# Patient Record
Sex: MALE | Race: BLACK OR AFRICAN AMERICAN | NOT HISPANIC OR LATINO
[De-identification: names, ages, dates, MRNs, and addresses within clinical notes are randomized per-mention and may not be internally consistent; named-entity substitution may affect disease eponyms.]

---

## 2017-09-11 ENCOUNTER — APPOINTMENT (OUTPATIENT)
Dept: CARDIOLOGY | Facility: CLINIC | Age: 52
End: 2017-09-11

## 2021-03-02 ENCOUNTER — RESULT REVIEW (OUTPATIENT)
Age: 56
End: 2021-03-02

## 2021-03-23 ENCOUNTER — NON-APPOINTMENT (OUTPATIENT)
Age: 56
End: 2021-03-23

## 2021-03-23 ENCOUNTER — APPOINTMENT (OUTPATIENT)
Dept: RADIATION ONCOLOGY | Facility: CLINIC | Age: 56
End: 2021-03-23
Payer: MEDICAID

## 2021-03-23 ENCOUNTER — OUTPATIENT (OUTPATIENT)
Dept: OUTPATIENT SERVICES | Facility: HOSPITAL | Age: 56
LOS: 1 days | Discharge: HOME | End: 2021-03-23
Payer: MEDICAID

## 2021-03-23 VITALS
TEMPERATURE: 97 F | WEIGHT: 159.94 LBS | DIASTOLIC BLOOD PRESSURE: 78 MMHG | RESPIRATION RATE: 17 BRPM | HEIGHT: 73 IN | BODY MASS INDEX: 21.2 KG/M2 | HEART RATE: 70 BPM | SYSTOLIC BLOOD PRESSURE: 123 MMHG | OXYGEN SATURATION: 98 %

## 2021-03-23 DIAGNOSIS — Z87.891 PERSONAL HISTORY OF NICOTINE DEPENDENCE: ICD-10-CM

## 2021-03-23 DIAGNOSIS — Z98.89 OTHER SPECIFIED POSTPROCEDURAL STATES: Chronic | ICD-10-CM

## 2021-03-23 DIAGNOSIS — Z78.9 OTHER SPECIFIED HEALTH STATUS: ICD-10-CM

## 2021-03-23 PROCEDURE — 99072 ADDL SUPL MATRL&STAF TM PHE: CPT

## 2021-03-23 PROCEDURE — 99204 OFFICE O/P NEW MOD 45 MIN: CPT | Mod: 25,GC

## 2021-03-23 RX ORDER — HYDROCODONE BITARTRATE AND ACETAMINOPHEN 5; 325 MG/1; MG/1
5-325 TABLET ORAL
Refills: 0 | Status: ACTIVE | COMMUNITY

## 2021-03-23 NOTE — VITALS
[Maximal Pain Intensity: 7/10] : 7/10 [Least Pain Intensity: 0/10] : 0/10 [Pain Description/Quality: ___] : Pain description/quality: [unfilled] [Pain Duration: ___] : Pain duration: [unfilled] [Pain Location: ___] : Pain Location: [unfilled] [Pain Interferes with ADLs] : Pain interferes with activities of daily living. [NoTreatment Scheduled] : no treatment scheduled [80: Normal activity with effort; some signs or symptoms of disease.] : 80: Normal activity with effort; some signs or symptoms of disease.  [ECOG Performance Status: 1 - Restricted in physically strenuous activity but ambulatory and able to carry out work of a light or sedentary nature] : Performance Status: 1 - Restricted in physically strenuous activity but ambulatory and able to carry out work of a light or sedentary nature, e.g., light house work, office work

## 2021-03-23 NOTE — REASON FOR VISIT
[Head and Neck Cancer] : head and neck cancer [Spouse] : spouse [Consideration of Curative Therapy] : consideration of curative therapy for

## 2021-03-24 ENCOUNTER — NON-APPOINTMENT (OUTPATIENT)
Age: 56
End: 2021-03-24

## 2021-03-24 NOTE — DISEASE MANAGEMENT
[Clinical] : TNM Stage: c [III] : III [FreeTextEntry4] : tonsil sqcc [TTNM] : 2 [NTNM] : 1 [MTNM] : 0

## 2021-03-24 NOTE — HISTORY OF PRESENT ILLNESS
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 \par Lymph node, right level 2 biopsy positive for squamous cell carcinoma on March 2 2021; reportedly HPV/P16 negative. \par No scans\par \par Outside Ct neck and Pet showed right sided tonsil mass extending to Right BOT. Right sided LAD max 2.8cm \par \par Tonsillectomy 3/9, path not available. \par \par He has lost 20 lbs weight since surgery. \par \par \par \par

## 2021-03-24 NOTE — PHYSICAL EXAM
[Normal] : well developed, well nourished, in no acute distress [Sclera] : the sclera and conjunctiva were normal [Outer Ear] : the ears and nose were normal in appearance [] : no respiratory distress [Heart Rate And Rhythm] : heart rate and rhythm were normal [de-identified] : s/p b/l tonsillectomy, 4cm right sided fixed level 2 LN

## 2021-03-25 ENCOUNTER — OUTPATIENT (OUTPATIENT)
Dept: OUTPATIENT SERVICES | Facility: HOSPITAL | Age: 56
LOS: 1 days | Discharge: HOME | End: 2021-03-25

## 2021-03-25 DIAGNOSIS — C09.9 MALIGNANT NEOPLASM OF TONSIL, UNSPECIFIED: ICD-10-CM

## 2021-03-25 DIAGNOSIS — Z98.89 OTHER SPECIFIED POSTPROCEDURAL STATES: Chronic | ICD-10-CM

## 2021-03-30 ENCOUNTER — OUTPATIENT (OUTPATIENT)
Dept: OUTPATIENT SERVICES | Facility: HOSPITAL | Age: 56
LOS: 1 days | Discharge: HOME | End: 2021-03-30

## 2021-03-30 ENCOUNTER — RESULT REVIEW (OUTPATIENT)
Age: 56
End: 2021-03-30

## 2021-03-30 ENCOUNTER — APPOINTMENT (OUTPATIENT)
Dept: HEMATOLOGY ONCOLOGY | Facility: CLINIC | Age: 56
End: 2021-03-30
Payer: MEDICAID

## 2021-03-30 ENCOUNTER — APPOINTMENT (OUTPATIENT)
Dept: OTOLARYNGOLOGY | Facility: CLINIC | Age: 56
End: 2021-03-30
Payer: MEDICAID

## 2021-03-30 VITALS
DIASTOLIC BLOOD PRESSURE: 82 MMHG | HEIGHT: 73 IN | SYSTOLIC BLOOD PRESSURE: 126 MMHG | BODY MASS INDEX: 20.54 KG/M2 | WEIGHT: 155 LBS | HEART RATE: 75 BPM

## 2021-03-30 VITALS
TEMPERATURE: 98.1 F | RESPIRATION RATE: 18 BRPM | DIASTOLIC BLOOD PRESSURE: 84 MMHG | HEART RATE: 67 BPM | OXYGEN SATURATION: 99 % | WEIGHT: 161.6 LBS | BODY MASS INDEX: 21.42 KG/M2 | SYSTOLIC BLOOD PRESSURE: 131 MMHG | HEIGHT: 72.99 IN

## 2021-03-30 DIAGNOSIS — Z98.89 OTHER SPECIFIED POSTPROCEDURAL STATES: Chronic | ICD-10-CM

## 2021-03-30 DIAGNOSIS — C79.89 SECONDARY MALIGNANT NEOPLASM OF OTHER SPECIFIED SITES: ICD-10-CM

## 2021-03-30 DIAGNOSIS — C80.1 SECONDARY MALIGNANT NEOPLASM OF OTHER SPECIFIED SITES: ICD-10-CM

## 2021-03-30 LAB
BASOPHILS # BLD AUTO: 0.1 K/UL — SIGNIFICANT CHANGE UP (ref 0–0.2)
BASOPHILS NFR BLD AUTO: 1.2 % — SIGNIFICANT CHANGE UP (ref 0–2)
EOSINOPHIL # BLD AUTO: 0.41 K/UL — SIGNIFICANT CHANGE UP (ref 0–0.5)
EOSINOPHIL NFR BLD AUTO: 4.9 % — SIGNIFICANT CHANGE UP (ref 0–6)
HCT VFR BLD CALC: 40.8 % — SIGNIFICANT CHANGE UP (ref 39–50)
HGB BLD-MCNC: 13.4 G/DL — SIGNIFICANT CHANGE UP (ref 13–17)
IMM GRANULOCYTES NFR BLD AUTO: 0.5 % — SIGNIFICANT CHANGE UP (ref 0–1.5)
LYMPHOCYTES # BLD AUTO: 2.67 K/UL — SIGNIFICANT CHANGE UP (ref 1–3.3)
LYMPHOCYTES # BLD AUTO: 31.9 % — SIGNIFICANT CHANGE UP (ref 13–44)
MCHC RBC-ENTMCNC: 29.2 PG — SIGNIFICANT CHANGE UP (ref 27–34)
MCHC RBC-ENTMCNC: 32.8 G/DL — SIGNIFICANT CHANGE UP (ref 32–36)
MCV RBC AUTO: 88.9 FL — SIGNIFICANT CHANGE UP (ref 80–100)
MONOCYTES # BLD AUTO: 0.91 K/UL — HIGH (ref 0–0.9)
MONOCYTES NFR BLD AUTO: 10.9 % — SIGNIFICANT CHANGE UP (ref 2–14)
NEUTROPHILS # BLD AUTO: 4.24 K/UL — SIGNIFICANT CHANGE UP (ref 1.8–7.4)
NEUTROPHILS NFR BLD AUTO: 50.6 % — SIGNIFICANT CHANGE UP (ref 43–77)
NRBC # BLD: 0 /100 WBCS — SIGNIFICANT CHANGE UP (ref 0–0)
PLATELET # BLD AUTO: 396 K/UL — SIGNIFICANT CHANGE UP (ref 150–400)
RBC # BLD: 4.59 M/UL — SIGNIFICANT CHANGE UP (ref 4.2–5.8)
RBC # FLD: 14.2 % — SIGNIFICANT CHANGE UP (ref 10.3–14.5)
WBC # BLD: 8.37 K/UL — SIGNIFICANT CHANGE UP (ref 3.8–10.5)
WBC # FLD AUTO: 8.37 K/UL — SIGNIFICANT CHANGE UP (ref 3.8–10.5)

## 2021-03-30 PROCEDURE — 99205 OFFICE O/P NEW HI 60 MIN: CPT

## 2021-03-30 PROCEDURE — 99072 ADDL SUPL MATRL&STAF TM PHE: CPT

## 2021-03-30 PROCEDURE — 99205 OFFICE O/P NEW HI 60 MIN: CPT | Mod: 25

## 2021-03-30 PROCEDURE — 31575 DIAGNOSTIC LARYNGOSCOPY: CPT

## 2021-03-30 RX ORDER — NUT.TX.IMPAIRED DIGESTIVE FXN 0.035-1/ML
LIQUID (ML) ORAL
Qty: 60 | Refills: 0 | Status: ACTIVE | COMMUNITY
Start: 2021-03-30 | End: 1900-01-01

## 2021-03-30 NOTE — DATA REVIEWED
[de-identified] : Path:\par R neck FNA = SCCA\par \par Tonsils and BOT biopsies - Negative for CA\par  [de-identified] : PET/CT - + in R neck with low uptake in tonsils

## 2021-03-30 NOTE — PHYSICAL EXAM
[Midline] : trachea located in midline position [Laryngoscopy Performed] : laryngoscopy was performed, see procedure section for findings [Normal] : no rashes [de-identified] : 5 cm mobile nontender R level II neck mass.

## 2021-03-30 NOTE — PROCEDURE
[Unable to Cooperate with Mirror] : patient unable to cooperate with mirror [Lesion] : lesion identified by mirror examination needing further evaluation [Topical Lidocaine] : topical lidocaine [Oxymetazoline HCl] : oxymetazoline HCl [Flexible Endoscope] : examined with the flexible endoscope [Serial Number: ___] : Serial Number: [unfilled] [Normal] : the false vocal folds were pink and regular, the ventricular sulcus was open, the true vocal folds were glistening white, tense and of equal length, mobility, and height [True Vocal Cords Paralysis] : no true vocal cord paralysis [True Vocal Cords Erythematous] : no true vocal cord edema [True Vocal Cords Watson's Nodules] : no true vocal cord nodules [Glottis Arytenoid Cartilages] : no arytenoid granulomas [Glottis Arytenoid Cartilages Erythema] : no arytenoid erythema [Interarytenoid Edema] : interarytenoid area edematous

## 2021-03-30 NOTE — HISTORY OF PRESENT ILLNESS
[de-identified] : 55 year male with history of tonsil cancer, referred by Dr. Negro Moser, for right sided neck mass. Reports progressively have gotten larger. Denies pain, drainage. FNA 03/02/2021 showed squamous cell carcinoma. S/p tonsillectomy 03/09/2021, reports weight loss of 22 lb since then. States loss of taste after surgery. Denies dysphagia, odynophagia, dyspnea, dysphonia. Reports right otalgia. Reports feels like he's underwater. Denies hearing loss. Denies otorrhea. Has appointment with Dr. Moser for radiation suit fitting.

## 2021-03-30 NOTE — ASSESSMENT
[FreeTextEntry1] : T0N2M0 unknown primary SCCA of R neck.  Pt has already had a bilateral tonsillectomy and R BOT biopsies.  No primary tumor seen.  Pt to proceed with Chemo/RT.  Pt to f/u with me a month after treatment.  If the neck node does not resolve he may need a R MRND.

## 2021-03-30 NOTE — CONSULT LETTER
[Dear  ___] : Dear  [unfilled], [Consult Letter:] : I had the pleasure of evaluating your patient, [unfilled]. [Please see my note below.] : Please see my note below. [Consult Closing:] : Thank you very much for allowing me to participate in the care of this patient.  If you have any questions, please do not hesitate to contact me. [Sincerely,] : Sincerely, [DrHammad ___] : Dr. ALMAZAN [FreeTextEntry2] : Dr. Negro Moser ([facility] physician)\par  [FreeTextEntry3] : Daryl Mcneill MD, FACS\par Chief of Otolaryngology at [facility]\par \par Dept. of Otolaryngology\par [facility]\par   [DrHammad  ___] : Dr. ALMAZAN

## 2021-03-30 NOTE — REASON FOR VISIT
[Initial Consultation] : an initial consultation for [FreeTextEntry2] : referred by Dr. Negro Moser, for right sided neck mass.

## 2021-03-31 ENCOUNTER — NON-APPOINTMENT (OUTPATIENT)
Age: 56
End: 2021-03-31

## 2021-03-31 LAB — SARS-COV-2 RNA SPEC QL NAA+PROBE: SIGNIFICANT CHANGE UP

## 2021-03-31 PROCEDURE — 99072 ADDL SUPL MATRL&STAF TM PHE: CPT

## 2021-03-31 PROCEDURE — 77334 RADIATION TREATMENT AID(S): CPT | Mod: 26

## 2021-03-31 PROCEDURE — 77263 THER RADIOLOGY TX PLNG CPLX: CPT

## 2021-04-02 ENCOUNTER — APPOINTMENT (OUTPATIENT)
Dept: THORACIC SURGERY | Facility: CLINIC | Age: 56
End: 2021-04-02
Payer: MEDICAID

## 2021-04-02 VITALS
BODY MASS INDEX: 19.88 KG/M2 | OXYGEN SATURATION: 99 % | SYSTOLIC BLOOD PRESSURE: 149 MMHG | HEIGHT: 73 IN | RESPIRATION RATE: 16 BRPM | HEART RATE: 72 BPM | DIASTOLIC BLOOD PRESSURE: 89 MMHG | WEIGHT: 150 LBS

## 2021-04-02 LAB
ALBUMIN SERPL ELPH-MCNC: 3.9 G/DL
ALP BLD-CCNC: 60 U/L
ALT SERPL-CCNC: 17 U/L
ANION GAP SERPL CALC-SCNC: 13 MMOL/L
AST SERPL-CCNC: 20 U/L
BILIRUB SERPL-MCNC: 0.4 MG/DL
BUN SERPL-MCNC: 15 MG/DL
CALCIUM SERPL-MCNC: 9.5 MG/DL
CHLORIDE SERPL-SCNC: 103 MMOL/L
CO2 SERPL-SCNC: 25 MMOL/L
CREAT SERPL-MCNC: 1.24 MG/DL
GLUCOSE SERPL-MCNC: 78 MG/DL
HBV CORE IGG+IGM SER QL: REACTIVE
HBV SURFACE AB SER QL: REACTIVE
HBV SURFACE AG SER QL: NONREACTIVE
HCV AB SER QL: NONREACTIVE
HCV S/CO RATIO: 0.05 S/CO
HIV1+2 AB SPEC QL IA.RAPID: NONREACTIVE
POTASSIUM SERPL-SCNC: 4.4 MMOL/L
PROT SERPL-MCNC: 6.4 G/DL
SODIUM SERPL-SCNC: 141 MMOL/L
TSH SERPL-ACNC: 0.47 UIU/ML

## 2021-04-02 PROCEDURE — 99205 OFFICE O/P NEW HI 60 MIN: CPT

## 2021-04-02 PROCEDURE — 99072 ADDL SUPL MATRL&STAF TM PHE: CPT

## 2021-04-02 NOTE — HISTORY OF PRESENT ILLNESS
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 \par S/p tonsillectomy 03/09/2021, path negative for malignancy.\par Reports weight loss of 22 lb since tonsillectomy. States loss of taste after surgery. \par \par PET/CT 3/9/2021 - Hypermetabolic right neck lesion likely secondary to enlarged hypermetabolic LNs. There is slight asymmetry of the tonsils with slight increased activity on the right versus the left. No hypermetabolic evidence of distant metastatic disease. \par \par Denies dysphagia, odynophagia, dyspnea, dysphonia. Reports right otalgia. Reports feels like he's underwater. Denies hearing loss.\par

## 2021-04-02 NOTE — PHYSICAL EXAM
[General Appearance - Alert] : alert [General Appearance - In No Acute Distress] : in no acute distress [General Appearance - Well Nourished] : well nourished [General Appearance - Well-Appearing] : healthy appearing [General Appearance - Well Developed] : well developed [Sclera] : the sclera and conjunctiva were normal [PERRL With Normal Accommodation] : pupils were equal in size, round, and reactive to light [Extraocular Movements] : extraocular movements were intact [Outer Ear] : the ears and nose were normal in appearance [Hearing Threshold Finger Rub Not [place]] : hearing was normal [Examination Of The Oral Cavity] : the lips and gums were normal [Neck Appearance] : the appearance of the neck was normal [Neck Cervical Mass (___cm)] : no neck mass was observed [Respiration, Rhythm And Depth] : normal respiratory rhythm and effort [Exaggerated Use Of Accessory Muscles For Inspiration] : no accessory muscle use [Auscultation Breath Sounds / Voice Sounds] : lungs were clear to auscultation bilaterally [Heart Rate And Rhythm] : heart rate was normal and rhythm regular [Examination Of The Chest] : the chest was normal in appearance [Chest Visual Inspection Thoracic Asymmetry] : no chest asymmetry [Diminished Respiratory Excursion] : normal chest expansion [2+] : left 2+ [No Pulse Delay] : no pulse delay [Breast Palpation Mass] : no palpable masses [Breast Appearance] : normal in appearance [Bowel Sounds] : normal bowel sounds [Abdomen Soft] : soft [Abdomen Tenderness] : non-tender [Cervical Lymph Nodes Enlarged Posterior Bilaterally] : posterior cervical [Supraclavicular Lymph Nodes Enlarged Bilaterally] : supraclavicular [Cervical Lymph Nodes Enlarged Anterior Bilaterally] : anterior cervical [No CVA Tenderness] : no ~M costovertebral angle tenderness [No Spinal Tenderness] : no spinal tenderness [Abnormal Walk] : normal gait [Nail Clubbing] : no clubbing  or cyanosis of the fingernails [Musculoskeletal - Swelling] : no joint swelling seen [Skin Color & Pigmentation] : normal skin color and pigmentation [Skin Turgor] : normal skin turgor [] : no rash [Deep Tendon Reflexes (DTR)] : deep tendon reflexes were 2+ and symmetric [Sensation] : the sensory exam was normal to light touch and pinprick [Motor Exam] : the motor exam was normal [No Focal Deficits] : no focal deficits [Oriented To Time, Place, And Person] : oriented to person, place, and time [Impaired Insight] : insight and judgment were intact [Affect] : the affect was normal [Mood] : the mood was normal [Memory Recent] : recent memory was not impaired [Memory Remote] : remote memory was not impaired [FreeTextEntry1] : Deferred

## 2021-04-02 NOTE — HISTORY OF PRESENT ILLNESS
[FreeTextEntry1] : Mr. 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 \par \par Presents to office for evaluation of feeding tube placement. Endorses that he's able to swallow solids without difficulty, but has experienced an approximate 20lb weight loss since surgery due to loss of taste. Today, Patient denies worsening SOB, chest pain, cough, hemoptysis, fever, chills, night sweats, lightheadedness or dizziness.\par \par \par

## 2021-04-02 NOTE — REVIEW OF SYSTEMS
[Fatigue] : fatigue [Recent Change In Weight] : ~T recent weight change [Odynophagia] : odynophagia [Negative] : Allergic/Immunologic
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
## 2021-04-02 NOTE — ASSESSMENT
[FreeTextEntry1] : Mr. 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 \par \par I have independently reviewed the medical records patient at the time of this office consultation, and reviewed the following interpretations and recommendations with the patient:\par - Discussed necessity of feeding tube placement for nutritional support during radiation treatment. Recommended Upper Endoscopy PEG placement. Risks, benefits and alternatives explained to patient; All questions answered and patient agrees to proceed with surgery. \par - Office to obtain recent PET/CT imaging prior to surgery\par \par I personally performed the services described in the documentation, reviewed the documentation recorded by the scribe in my presence and it accurately and completely records my words and actions.\par \par I, PER ChaoC, am scribing for and the presence of Dr. MONTESINOS [facility], the following sections HISTORY OF PRESENT ILLNESS, PAST MEDICAL/FAMILY/SOCIAL HISTORY; REVIEW OF SYSTEMS; VITAL SIGNS; PHYSICAL EXAM; DISPOSITION.\par \par \par

## 2021-04-02 NOTE — CONSULT LETTER
[Dear  ___] : Dear  [unfilled], [Consult Letter:] : I had the pleasure of evaluating your patient, [unfilled]. [Please see my note below.] : Please see my note below. [Consult Closing:] : Thank you very much for allowing me to participate in the care of this patient.  If you have any questions, please do not hesitate to contact me. [Sincerely,] : Sincerely, [FreeTextEntry2] : Dr. Negro Moser (Rad/Onc/Ref)\par Dr. Daryl Mcneill (MICKEY)\par Dr. Radha Lopez (Heme/Onc) [FreeTextEntry3] : Javi Cortes MD, FACS \par Chief, Division of Thoracic Surgery \par Director, Minimally Invasive Thoracic Surgery \par Department of Cardiovascular and Thoracic Surgery \par [facility] \par , Cardiovascular and Thoracic Surgery\par \par \par

## 2021-04-02 NOTE — PHYSICAL EXAM
[Fully active, able to carry on all pre-disease performance without restriction] : Status 0 - Fully active, able to carry on all pre-disease performance without restriction [Normal] : affect appropriate [de-identified] : large right sided neck mass

## 2021-04-05 ENCOUNTER — OUTPATIENT (OUTPATIENT)
Dept: OUTPATIENT SERVICES | Facility: HOSPITAL | Age: 56
LOS: 1 days | Discharge: HOME | End: 2021-04-05

## 2021-04-05 ENCOUNTER — APPOINTMENT (OUTPATIENT)
Dept: SPEECH THERAPY | Facility: CLINIC | Age: 56
End: 2021-04-05

## 2021-04-05 DIAGNOSIS — Z98.89 OTHER SPECIFIED POSTPROCEDURAL STATES: Chronic | ICD-10-CM

## 2021-04-08 PROCEDURE — 77300 RADIATION THERAPY DOSE PLAN: CPT | Mod: 26

## 2021-04-08 PROCEDURE — 77338 DESIGN MLC DEVICE FOR IMRT: CPT | Mod: 26

## 2021-04-08 PROCEDURE — 77301 RADIOTHERAPY DOSE PLAN IMRT: CPT | Mod: 26

## 2021-04-09 ENCOUNTER — OUTPATIENT (OUTPATIENT)
Dept: OUTPATIENT SERVICES | Facility: HOSPITAL | Age: 56
LOS: 1 days | Discharge: HOME | End: 2021-04-09
Payer: MEDICAID

## 2021-04-09 ENCOUNTER — APPOINTMENT (OUTPATIENT)
Dept: THORACIC SURGERY | Facility: HOSPITAL | Age: 56
End: 2021-04-09

## 2021-04-09 VITALS
TEMPERATURE: 98 F | DIASTOLIC BLOOD PRESSURE: 78 MMHG | HEIGHT: 73 IN | WEIGHT: 162.92 LBS | SYSTOLIC BLOOD PRESSURE: 126 MMHG | RESPIRATION RATE: 19 BRPM | OXYGEN SATURATION: 98 % | HEART RATE: 103 BPM

## 2021-04-09 VITALS
HEART RATE: 73 BPM | RESPIRATION RATE: 16 BRPM | OXYGEN SATURATION: 94 % | DIASTOLIC BLOOD PRESSURE: 90 MMHG | SYSTOLIC BLOOD PRESSURE: 170 MMHG

## 2021-04-09 DIAGNOSIS — R13.10 DYSPHAGIA, UNSPECIFIED: ICD-10-CM

## 2021-04-09 DIAGNOSIS — Z98.89 OTHER SPECIFIED POSTPROCEDURAL STATES: Chronic | ICD-10-CM

## 2021-04-09 DIAGNOSIS — C79.89 SECONDARY MALIGNANT NEOPLASM OF OTHER SPECIFIED SITES: ICD-10-CM

## 2021-04-09 PROCEDURE — 43246 EGD PLACE GASTROSTOMY TUBE: CPT | Mod: 52
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
## 2021-04-12 ENCOUNTER — APPOINTMENT (OUTPATIENT)
Dept: HEPATOLOGY | Facility: CLINIC | Age: 56
End: 2021-04-12
Payer: MEDICAID

## 2021-04-12 VITALS
WEIGHT: 161 LBS | HEART RATE: 58 BPM | SYSTOLIC BLOOD PRESSURE: 145 MMHG | TEMPERATURE: 97.8 F | DIASTOLIC BLOOD PRESSURE: 84 MMHG | HEIGHT: 73 IN | BODY MASS INDEX: 21.34 KG/M2 | RESPIRATION RATE: 16 BRPM

## 2021-04-12 PROCEDURE — 99204 OFFICE O/P NEW MOD 45 MIN: CPT

## 2021-04-12 PROCEDURE — 99072 ADDL SUPL MATRL&STAF TM PHE: CPT
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
## 2021-04-12 NOTE — PHYSICAL EXAM
[Scleral Icterus] : No Scleral Icterus [Abdominal  Ascites] : no ascites [Asterixis] : no asterixis observed [Jaundice] : No jaundice [Depression] : no depression [General Appearance - Alert] : alert [General Appearance - In No Acute Distress] : in no acute distress [Outer Ear] : the ears and nose were normal in appearance [Oropharynx] : the oropharynx was normal [Neck Appearance] : the appearance of the neck was normal [Neck Cervical Mass (___cm)] : no neck mass was observed [Jugular Venous Distention Increased] : there was no jugular-venous distention [Thyroid Diffuse Enlargement] : the thyroid was not enlarged [Thyroid Nodule] : there were no palpable thyroid nodules [Bowel Sounds] : normal bowel sounds [Abdomen Soft] : soft [Abdomen Tenderness] : non-tender [Abdomen Mass (___ Cm)] : no abdominal mass palpated [Skin Color & Pigmentation] : normal skin color and pigmentation [Skin Turgor] : normal skin turgor [Deep Tendon Reflexes (DTR)] : deep tendon reflexes were 2+ and symmetric [] : no rash [Sensation] : the sensory exam was normal to light touch and pinprick [No Focal Deficits] : no focal deficits [Oriented To Time, Place, And Person] : oriented to person, place, and time [Impaired Insight] : insight and judgment were intact [Affect] : the affect was normal
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
## 2021-04-12 NOTE — CONSULT LETTER
[Dear  ___] : Dear  [unfilled], [Consult Letter:] : I had the pleasure of evaluating your patient, [unfilled]. [( Thank you for referring [unfilled] for consultation for _____ )] : Thank you for referring [unfilled] for consultation for [unfilled] [Please see my note below.] : Please see my note below. [Consult Closing:] : Thank you very much for allowing me to participate in the care of this patient.  If you have any questions, please do not hesitate to contact me. [Sincerely,] : Sincerely, [FreeTextEntry2] : Radha Quiroz [FreeTextEntry3] : Dr. Vito Seay MD\par  of Medicine\par [facility]\par [facility]\par

## 2021-04-13 ENCOUNTER — NON-APPOINTMENT (OUTPATIENT)
Age: 56
End: 2021-04-13

## 2021-04-13 ENCOUNTER — EMERGENCY (EMERGENCY)
Facility: HOSPITAL | Age: 56
LOS: 1 days | Discharge: HOME | End: 2021-04-13
Attending: EMERGENCY MEDICINE | Admitting: EMERGENCY MEDICINE
Payer: MEDICAID

## 2021-04-13 VITALS
RESPIRATION RATE: 18 BRPM | TEMPERATURE: 99 F | SYSTOLIC BLOOD PRESSURE: 129 MMHG | OXYGEN SATURATION: 99 % | DIASTOLIC BLOOD PRESSURE: 89 MMHG | HEIGHT: 73 IN | HEART RATE: 68 BPM

## 2021-04-13 DIAGNOSIS — Z98.89 OTHER SPECIFIED POSTPROCEDURAL STATES: Chronic | ICD-10-CM

## 2021-04-13 PROCEDURE — 99282 EMERGENCY DEPT VISIT SF MDM: CPT

## 2021-04-13 NOTE — ED PROVIDER NOTE - PHYSICAL EXAMINATION
Gen: WDWN, NAD  HEENT: EOMI, no nasal discharge, mucous membranes moist  CV: RRR, +S1/S2, no M/R/G  Resp: CTAB, no W/R/R  GI: Abdomen soft non-distended, NTTP, no masses. + peg tube in abdomen, no purulent drainage within or around the tube.   MSK: No open wounds, no bruising, no LE edema  Neuro: A&Ox4, following commands, moving all four extremities spontaneously  Psych: appropriate mood

## 2021-04-13 NOTE — ED ADULT TRIAGE NOTE - CHIEF COMPLAINT QUOTE
Pt has a hx of throat and neck CA, had a PEG tube placed Friday. Was told to return to the ER for peg tube evaluation. Pt denies any complaints.   Wife [phone]

## 2021-04-13 NOTE — ED PROVIDER NOTE - PATIENT PORTAL LINK FT
You can access the FollowMyHealth Patient Portal offered by [facility] by registering at the following website: [email or web address]. By joining [employer]’s FollowMyHealth portal, you will also be able to view your health information using other applications (apps) compatible with our system.

## 2021-04-13 NOTE — ED PROVIDER NOTE - NSFOLLOWUPINSTRUCTIONS_ED_ALL_ED_FT
--take tylenol or motrin as needed for pain.     --given that you were in the ED today, we recommend a followup visit with your general doctor (primary care doctor) within 7 days OR surgeon    --your diagnosis is: PEG tube malfunction    --return to the ED if your current symptoms worsen, if fevers, purulent drainage from the tube.

## 2021-04-13 NOTE — ED PROVIDER NOTE - NS ED ROS FT
Gen: Denies fevers  CV: Denies chest pain  Endocrine: denies diabetes  Skin: denies purulent drainage from tube  Resp: Denies SOB, cough  GI: Denies nausea, vomiting  Msk: Denies extremity pain  : Denies dysuria

## 2021-04-13 NOTE — ED PROVIDER NOTE - CLINICAL SUMMARY MEDICAL DECISION MAKING FREE TEXT BOX
Stephanie Lang, PGY-1: 54YO male hx of head/neck CA and peg tube placement 5d prior with thoracic surgery. missed post-op appointment. presents today for checkup. pt denies any skin changes, any fevers/chills, purulent drainage around tube. able to use the tube without difficulty. denies nausea/vomiting/abdominal pain. low suspicion infection or obstruction. plan for thoracic consult, dc.

## 2021-04-13 NOTE — ED PROVIDER NOTE - OBJECTIVE STATEMENT
56YO male hx of head/neck CA and peg tube placement 5d prior with thoracic surgery. missed post-op appointment. presents today for checkup. pt denies any skin changes, any fevers/chills, purulent drainage around tube. able to use the tube without difficulty. denies nausea/vomiting/abdominal pain.

## 2021-04-13 NOTE — ED PROVIDER NOTE - ATTENDING CONTRIBUTION TO CARE
Dr. Elliott: 54 [facility] of unknown primary (thought to be tonsil but pathology negative), planning to have chemo and radiation starting soon, with recent PEG placement, in ED sent for adjustment of PEG tube.  PEG has been working and pt is still able to eat by mouth and with PEG.  No fever, abdominal pain, CP/SOB, N/V/D.  On exam pt chronically-ill appearing, in NAD, heart RRR, lungs CTAB, abd with midline surgical incision from [facility] in past, PEG in place and healing well, extremities without swelling, strength 5/5 in all extremities and skin without rash. Dr. Elliott: 54 [facility] cancer of unknown primary (thought to be tonsil but pathology negative), planning to have chemo and radiation starting soon, with recent PEG placement, in ED sent for adjustment of PEG tube.  PEG has been working and pt is still able to eat by mouth and with PEG.  No fever, abdominal pain, CP/SOB, N/V/D.  On exam pt chronically-ill appearing, in NAD, heart RRR, lungs CTAB, abd with midline surgical incision from [facility] in past, PEG in place and healing well, extremities without swelling, strength 5/5 in all extremities and skin without rash

## 2021-04-14 DIAGNOSIS — R76.8 OTHER SPECIFIED ABNORMAL IMMUNOLOGICAL FINDINGS IN SERUM: ICD-10-CM

## 2021-04-14 PROCEDURE — G6002: CPT | Mod: 26

## 2021-04-15 ENCOUNTER — NON-APPOINTMENT (OUTPATIENT)
Age: 56
End: 2021-04-15

## 2021-04-15 ENCOUNTER — LABORATORY RESULT (OUTPATIENT)
Age: 56
End: 2021-04-15

## 2021-04-15 ENCOUNTER — APPOINTMENT (OUTPATIENT)
Dept: HEMATOLOGY ONCOLOGY | Facility: CLINIC | Age: 56
End: 2021-04-15
Payer: MEDICAID

## 2021-04-15 VITALS
RESPIRATION RATE: 18 BRPM | BODY MASS INDEX: 21.74 KG/M2 | HEIGHT: 72.99 IN | TEMPERATURE: 97.8 F | DIASTOLIC BLOOD PRESSURE: 90 MMHG | WEIGHT: 164.02 LBS | OXYGEN SATURATION: 98 % | SYSTOLIC BLOOD PRESSURE: 137 MMHG | HEART RATE: 64 BPM

## 2021-04-15 DIAGNOSIS — H90.3 SENSORINEURAL HEARING LOSS, BILATERAL: ICD-10-CM

## 2021-04-15 DIAGNOSIS — R13.12 DYSPHAGIA, OROPHARYNGEAL PHASE: ICD-10-CM

## 2021-04-15 PROCEDURE — 99214 OFFICE O/P EST MOD 30 MIN: CPT

## 2021-04-15 PROCEDURE — G6002: CPT | Mod: 26

## 2021-04-15 PROCEDURE — 99072 ADDL SUPL MATRL&STAF TM PHE: CPT

## 2021-04-16 PROCEDURE — G6002: CPT | Mod: 26

## 2021-04-19 PROCEDURE — G6002: CPT | Mod: 26

## 2021-04-20 ENCOUNTER — RESULT REVIEW (OUTPATIENT)
Age: 56
End: 2021-04-20

## 2021-04-20 ENCOUNTER — APPOINTMENT (OUTPATIENT)
Dept: INFUSION THERAPY | Facility: HOSPITAL | Age: 56
End: 2021-04-20

## 2021-04-20 ENCOUNTER — LABORATORY RESULT (OUTPATIENT)
Age: 56
End: 2021-04-20

## 2021-04-20 DIAGNOSIS — R11.2 NAUSEA WITH VOMITING, UNSPECIFIED: ICD-10-CM

## 2021-04-20 DIAGNOSIS — E86.0 DEHYDRATION: ICD-10-CM

## 2021-04-20 DIAGNOSIS — Z51.11 ENCOUNTER FOR ANTINEOPLASTIC CHEMOTHERAPY: ICD-10-CM

## 2021-04-20 LAB
BASOPHILS # BLD AUTO: 0.04 K/UL — SIGNIFICANT CHANGE UP (ref 0–0.2)
BASOPHILS NFR BLD AUTO: 0.6 % — SIGNIFICANT CHANGE UP (ref 0–2)
EOSINOPHIL # BLD AUTO: 0.32 K/UL — SIGNIFICANT CHANGE UP (ref 0–0.5)
EOSINOPHIL NFR BLD AUTO: 4.8 % — SIGNIFICANT CHANGE UP (ref 0–6)
HCT VFR BLD CALC: 39 % — SIGNIFICANT CHANGE UP (ref 39–50)
HGB BLD-MCNC: 13.2 G/DL — SIGNIFICANT CHANGE UP (ref 13–17)
IMM GRANULOCYTES NFR BLD AUTO: 0.3 % — SIGNIFICANT CHANGE UP (ref 0–1.5)
LYMPHOCYTES # BLD AUTO: 1.11 K/UL — SIGNIFICANT CHANGE UP (ref 1–3.3)
LYMPHOCYTES # BLD AUTO: 16.7 % — SIGNIFICANT CHANGE UP (ref 13–44)
MCHC RBC-ENTMCNC: 29.4 PG — SIGNIFICANT CHANGE UP (ref 27–34)
MCHC RBC-ENTMCNC: 33.8 G/DL — SIGNIFICANT CHANGE UP (ref 32–36)
MCV RBC AUTO: 86.9 FL — SIGNIFICANT CHANGE UP (ref 80–100)
MONOCYTES # BLD AUTO: 0.87 K/UL — SIGNIFICANT CHANGE UP (ref 0–0.9)
MONOCYTES NFR BLD AUTO: 13.1 % — SIGNIFICANT CHANGE UP (ref 2–14)
NEUTROPHILS # BLD AUTO: 4.27 K/UL — SIGNIFICANT CHANGE UP (ref 1.8–7.4)
NEUTROPHILS NFR BLD AUTO: 64.5 % — SIGNIFICANT CHANGE UP (ref 43–77)
NRBC # BLD: 0 /100 WBCS — SIGNIFICANT CHANGE UP (ref 0–0)
PLATELET # BLD AUTO: 376 K/UL — SIGNIFICANT CHANGE UP (ref 150–400)
RBC # BLD: 4.49 M/UL — SIGNIFICANT CHANGE UP (ref 4.2–5.8)
RBC # FLD: 14.5 % — SIGNIFICANT CHANGE UP (ref 10.3–14.5)
WBC # BLD: 6.63 K/UL — SIGNIFICANT CHANGE UP (ref 3.8–10.5)
WBC # FLD AUTO: 6.63 K/UL — SIGNIFICANT CHANGE UP (ref 3.8–10.5)

## 2021-04-20 PROCEDURE — 77014: CPT | Mod: 26

## 2021-04-21 ENCOUNTER — NON-APPOINTMENT (OUTPATIENT)
Age: 56
End: 2021-04-21

## 2021-04-21 VITALS
OXYGEN SATURATION: 100 % | WEIGHT: 161.93 LBS | RESPIRATION RATE: 17 BRPM | SYSTOLIC BLOOD PRESSURE: 142 MMHG | BODY MASS INDEX: 21.37 KG/M2 | HEART RATE: 67 BPM | DIASTOLIC BLOOD PRESSURE: 87 MMHG

## 2021-04-21 PROCEDURE — G6002: CPT | Mod: 26

## 2021-04-21 PROCEDURE — 77427 RADIATION TX MANAGEMENT X5: CPT

## 2021-04-21 NOTE — HISTORY OF PRESENT ILLNESS
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 \par Lymph node, right level 2 biopsy positive for squamous cell carcinoma on March 2 2021; reportedly HPV/P16 negative. \par \par Presents today for OTV. Completed 6/35 fx. No c/o throat soreness at present time. Lost 3 lbs this week due to lack of taste and appetite, not using PEG. Skin and oral care reviewed.

## 2021-04-21 NOTE — REVIEW OF SYSTEMS
[Dysphagia: Grade 1 - Symptomatic, able to eat regular diet] : Dysphagia: Grade 1 - Symptomatic, able to eat regular diet [Fatigue: Grade 0] : Fatigue: Grade 0 [Mucositis Oral: Grade 0] : Mucositis Oral: Grade 0  [Xerostomia: Grade 1 - Symptomatic (e.g., dry or thick saliva) without significant dietary alteration; unstimulated saliva flow >0.2 ml/min] : Xerostomia: Grade 1 - Symptomatic (e.g., dry or thick saliva) without significant dietary alteration; unstimulated saliva flow >0.2 ml/min [Oral Pain: Grade 0] : Oral Pain: Grade 0 [Dysgeusia: Grade 2 - Altered taste with change in diet (e.g., oral supplements); noxious or unpleasant taste; loss of taste] : Dysgeusia: Grade 2 - Altered taste with change in diet (e.g., oral supplements); noxious or unpleasant taste; loss of taste [Skin Hyperpigmentation: Grade 0] : Skin Hyperpigmentation: Grade 0 [Dermatitis Radiation: Grade 1 - Faint erythema or dry desquamation] : Dermatitis Radiation: Grade 1 - Faint erythema or dry desquamation [Cough: Grade 0] : Cough: Grade 0 [Hoarseness: Grade 0] : Hoarseness: Grade 0

## 2021-04-21 NOTE — VITALS
[Maximal Pain Intensity: 5/10] : 5/10 [Least Pain Intensity: 0/10] : 0/10 [Pain Description/Quality: ___] : Pain description/quality: [unfilled] [Pain Duration: ___] : Pain duration: [unfilled] [Pain Location: ___] : Pain Location: [unfilled] [Pain Interferes with ADLs] : Pain interferes with activities of daily living. [NoTreatment Scheduled] : no treatment scheduled [OTC] : OTC [80: Normal activity with effort; some signs or symptoms of disease.] : 80: Normal activity with effort; some signs or symptoms of disease.  [ECOG Performance Status: 1 - Restricted in physically strenuous activity but ambulatory and able to carry out work of a light or sedentary nature] : Performance Status: 1 - Restricted in physically strenuous activity but ambulatory and able to carry out work of a light or sedentary nature, e.g., light house work, office work

## 2021-04-21 NOTE — DISEASE MANAGEMENT
[Clinical] : TNM Stage: c [III] : III [FreeTextEntry4] : tonsil sqcc [TTNM] : 2 [NTNM] : 1 [MTNM] : 0 [de-identified] : 1200 [de-identified] : 1903 [de-identified] : Oropharynx/neck

## 2021-04-22 PROCEDURE — G6002: CPT | Mod: 26
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
## 2021-04-22 NOTE — HISTORY OF PRESENT ILLNESS
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 \par S/p tonsillectomy 03/09/2021, path negative for malignancy.\par Reports weight loss of 22 lb since tonsillectomy. States loss of taste after surgery. \par \par PET/CT 3/9/2021 - Hypermetabolic right neck lesion likely secondary to enlarged hypermetabolic LNs. There is slight asymmetry of the tonsils with slight increased activity on the right versus the left. No hypermetabolic evidence of distant metastatic disease. \par \par Denies dysphagia, odynophagia, dyspnea, dysphonia. Reports right otalgia s/p tonsilectomy. Reports feels like he's underwater. Denies hearing loss.\par He had an audiology exam that was normal.  [de-identified] : Started RT today. \par S/p PEG tube placement, is taking food PO and does not use PEG tube for now. \par Overall stable. \par Very nervous about treatment course.

## 2021-04-22 NOTE — PHYSICAL EXAM
[Fully active, able to carry on all pre-disease performance without restriction] : Status 0 - Fully active, able to carry on all pre-disease performance without restriction [Normal] : affect appropriate [de-identified] : large right sided neck mass

## 2021-04-23 ENCOUNTER — LABORATORY RESULT (OUTPATIENT)
Age: 56
End: 2021-04-23

## 2021-04-23 ENCOUNTER — RESULT REVIEW (OUTPATIENT)
Age: 56
End: 2021-04-23

## 2021-04-23 ENCOUNTER — NON-APPOINTMENT (OUTPATIENT)
Age: 56
End: 2021-04-23

## 2021-04-23 ENCOUNTER — APPOINTMENT (OUTPATIENT)
Dept: INFUSION THERAPY | Facility: HOSPITAL | Age: 56
End: 2021-04-23

## 2021-04-23 LAB
BASOPHILS # BLD AUTO: 0.03 K/UL — SIGNIFICANT CHANGE UP (ref 0–0.2)
BASOPHILS NFR BLD AUTO: 0.4 % — SIGNIFICANT CHANGE UP (ref 0–2)
EOSINOPHIL # BLD AUTO: 0.18 K/UL — SIGNIFICANT CHANGE UP (ref 0–0.5)
EOSINOPHIL NFR BLD AUTO: 2.3 % — SIGNIFICANT CHANGE UP (ref 0–6)
HCT VFR BLD CALC: 33.1 % — LOW (ref 39–50)
HGB BLD-MCNC: 11 G/DL — LOW (ref 13–17)
IMM GRANULOCYTES NFR BLD AUTO: 0.6 % — SIGNIFICANT CHANGE UP (ref 0–1.5)
LYMPHOCYTES # BLD AUTO: 0.94 K/UL — LOW (ref 1–3.3)
LYMPHOCYTES # BLD AUTO: 11.9 % — LOW (ref 13–44)
MCHC RBC-ENTMCNC: 29.6 PG — SIGNIFICANT CHANGE UP (ref 27–34)
MCHC RBC-ENTMCNC: 33.2 G/DL — SIGNIFICANT CHANGE UP (ref 32–36)
MCV RBC AUTO: 89 FL — SIGNIFICANT CHANGE UP (ref 80–100)
MONOCYTES # BLD AUTO: 0.78 K/UL — SIGNIFICANT CHANGE UP (ref 0–0.9)
MONOCYTES NFR BLD AUTO: 9.8 % — SIGNIFICANT CHANGE UP (ref 2–14)
NEUTROPHILS # BLD AUTO: 5.94 K/UL — SIGNIFICANT CHANGE UP (ref 1.8–7.4)
NEUTROPHILS NFR BLD AUTO: 75 % — SIGNIFICANT CHANGE UP (ref 43–77)
NRBC # BLD: 0 /100 WBCS — SIGNIFICANT CHANGE UP (ref 0–0)
PLATELET # BLD AUTO: 327 K/UL — SIGNIFICANT CHANGE UP (ref 150–400)
RBC # BLD: 3.72 M/UL — LOW (ref 4.2–5.8)
RBC # FLD: 14.6 % — HIGH (ref 10.3–14.5)
WBC # BLD: 7.92 K/UL — SIGNIFICANT CHANGE UP (ref 3.8–10.5)
WBC # FLD AUTO: 7.92 K/UL — SIGNIFICANT CHANGE UP (ref 3.8–10.5)

## 2021-04-23 PROCEDURE — G6002: CPT | Mod: 26

## 2021-04-24 ENCOUNTER — OUTPATIENT (OUTPATIENT)
Dept: OUTPATIENT SERVICES | Facility: HOSPITAL | Age: 56
LOS: 1 days | Discharge: HOME | End: 2021-04-24

## 2021-04-24 DIAGNOSIS — Z98.89 OTHER SPECIFIED POSTPROCEDURAL STATES: Chronic | ICD-10-CM

## 2021-04-24 DIAGNOSIS — F43.23 ADJUSTMENT DISORDER WITH MIXED ANXIETY AND DEPRESSED MOOD: ICD-10-CM

## 2021-04-25 ENCOUNTER — NON-APPOINTMENT (OUTPATIENT)
Age: 56
End: 2021-04-25

## 2021-04-26 PROCEDURE — G6002: CPT | Mod: 26

## 2021-04-27 ENCOUNTER — APPOINTMENT (OUTPATIENT)
Dept: INFUSION THERAPY | Facility: HOSPITAL | Age: 56
End: 2021-04-27

## 2021-04-27 ENCOUNTER — RESULT REVIEW (OUTPATIENT)
Age: 56
End: 2021-04-27

## 2021-04-27 ENCOUNTER — LABORATORY RESULT (OUTPATIENT)
Age: 56
End: 2021-04-27

## 2021-04-27 ENCOUNTER — NON-APPOINTMENT (OUTPATIENT)
Age: 56
End: 2021-04-27

## 2021-04-27 ENCOUNTER — APPOINTMENT (OUTPATIENT)
Dept: HEMATOLOGY ONCOLOGY | Facility: CLINIC | Age: 56
End: 2021-04-27
Payer: MEDICAID

## 2021-04-27 DIAGNOSIS — E86.0 DEHYDRATION: ICD-10-CM

## 2021-04-27 DIAGNOSIS — Z51.11 ENCOUNTER FOR ANTINEOPLASTIC CHEMOTHERAPY: ICD-10-CM

## 2021-04-27 DIAGNOSIS — R11.2 NAUSEA WITH VOMITING, UNSPECIFIED: ICD-10-CM

## 2021-04-27 DIAGNOSIS — C09.9 MALIGNANT NEOPLASM OF TONSIL, UNSPECIFIED: ICD-10-CM

## 2021-04-27 LAB
BASOPHILS # BLD AUTO: 0.05 K/UL — SIGNIFICANT CHANGE UP (ref 0–0.2)
BASOPHILS NFR BLD AUTO: 0.8 % — SIGNIFICANT CHANGE UP (ref 0–2)
EOSINOPHIL # BLD AUTO: 0.4 K/UL — SIGNIFICANT CHANGE UP (ref 0–0.5)
EOSINOPHIL NFR BLD AUTO: 6.7 % — HIGH (ref 0–6)
HCT VFR BLD CALC: 35.2 % — LOW (ref 39–50)
HGB BLD-MCNC: 11.9 G/DL — LOW (ref 13–17)
IMM GRANULOCYTES NFR BLD AUTO: 0.8 % — SIGNIFICANT CHANGE UP (ref 0–1.5)
LYMPHOCYTES # BLD AUTO: 0.68 K/UL — LOW (ref 1–3.3)
LYMPHOCYTES # BLD AUTO: 11.3 % — LOW (ref 13–44)
MCHC RBC-ENTMCNC: 29.5 PG — SIGNIFICANT CHANGE UP (ref 27–34)
MCHC RBC-ENTMCNC: 33.8 G/DL — SIGNIFICANT CHANGE UP (ref 32–36)
MCV RBC AUTO: 87.1 FL — SIGNIFICANT CHANGE UP (ref 80–100)
MONOCYTES # BLD AUTO: 0.94 K/UL — HIGH (ref 0–0.9)
MONOCYTES NFR BLD AUTO: 15.7 % — HIGH (ref 2–14)
NEUTROPHILS # BLD AUTO: 3.88 K/UL — SIGNIFICANT CHANGE UP (ref 1.8–7.4)
NEUTROPHILS NFR BLD AUTO: 64.7 % — SIGNIFICANT CHANGE UP (ref 43–77)
NRBC # BLD: 0 /100 WBCS — SIGNIFICANT CHANGE UP (ref 0–0)
PLATELET # BLD AUTO: 404 K/UL — HIGH (ref 150–400)
RBC # BLD: 4.04 M/UL — LOW (ref 4.2–5.8)
RBC # FLD: 14.4 % — SIGNIFICANT CHANGE UP (ref 10.3–14.5)
WBC # BLD: 6 K/UL — SIGNIFICANT CHANGE UP (ref 3.8–10.5)
WBC # FLD AUTO: 6 K/UL — SIGNIFICANT CHANGE UP (ref 3.8–10.5)

## 2021-04-27 PROCEDURE — 99215 OFFICE O/P EST HI 40 MIN: CPT

## 2021-04-27 PROCEDURE — 77014: CPT | Mod: 26

## 2021-04-27 RX ORDER — DIPHENHYDRAMINE HYDROCHLORIDE AND LIDOCAINE HYDROCHLORIDE AND ALUMINUM HYDROXIDE AND MAGNESIUM HYDRO
KIT
Qty: 300 | Refills: 3 | Status: ACTIVE | COMMUNITY
Start: 2021-04-27 | End: 1900-01-01

## 2021-04-28 ENCOUNTER — NON-APPOINTMENT (OUTPATIENT)
Age: 56
End: 2021-04-28

## 2021-04-28 VITALS
SYSTOLIC BLOOD PRESSURE: 151 MMHG | DIASTOLIC BLOOD PRESSURE: 96 MMHG | WEIGHT: 165 LBS | HEART RATE: 114 BPM | TEMPERATURE: 97.3 F | OXYGEN SATURATION: 98 % | BODY MASS INDEX: 21.77 KG/M2 | RESPIRATION RATE: 16 BRPM

## 2021-04-28 PROCEDURE — 77427 RADIATION TX MANAGEMENT X5: CPT

## 2021-04-28 PROCEDURE — G6002: CPT | Mod: 26

## 2021-04-28 NOTE — REVIEW OF SYSTEMS
[Dysphagia: Grade 1 - Symptomatic, able to eat regular diet] : Dysphagia: Grade 1 - Symptomatic, able to eat regular diet [Fatigue: Grade 0] : Fatigue: Grade 0 [Mucositis Oral: Grade 0] : Mucositis Oral: Grade 0  [Xerostomia: Grade 1 - Symptomatic (e.g., dry or thick saliva) without significant dietary alteration; unstimulated saliva flow >0.2 ml/min] : Xerostomia: Grade 1 - Symptomatic (e.g., dry or thick saliva) without significant dietary alteration; unstimulated saliva flow >0.2 ml/min [Oral Pain: Grade 0] : Oral Pain: Grade 0 [Dysgeusia: Grade 2 - Altered taste with change in diet (e.g., oral supplements); noxious or unpleasant taste; loss of taste] : Dysgeusia: Grade 2 - Altered taste with change in diet (e.g., oral supplements); noxious or unpleasant taste; loss of taste [Cough: Grade 0] : Cough: Grade 0 [Hoarseness: Grade 0] : Hoarseness: Grade 0 [Skin Hyperpigmentation: Grade 0] : Skin Hyperpigmentation: Grade 0 [Dermatitis Radiation: Grade 1 - Faint erythema or dry desquamation] : Dermatitis Radiation: Grade 1 - Faint erythema or dry desquamation

## 2021-04-28 NOTE — DISEASE MANAGEMENT
[Clinical] : TNM Stage: c [III] : III [FreeTextEntry4] : tonsil sqcc [TTNM] : 2 [NTNM] : 1 [MTNM] : 0 [de-identified] : [id number] [de-identified] : [id number] [de-identified] : Oropharynx/neck

## 2021-04-28 NOTE — HISTORY OF PRESENT ILLNESS
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 \par Lymph node, right level 2 biopsy positive for squamous cell carcinoma on March 2 2021; reportedly HPV/P16 negative. \par \par Presents today for OTV. Completed 11/35 fx. C/o throat soreness, using magic mixture as prescribed with good relief. Lost 3 lbs this week due to lack of taste and appetite, not using PEG. Skin and oral care reviewed.

## 2021-04-29 ENCOUNTER — NON-APPOINTMENT (OUTPATIENT)
Age: 56
End: 2021-04-29

## 2021-04-29 PROCEDURE — G6002: CPT | Mod: 26

## 2021-04-30 ENCOUNTER — NON-APPOINTMENT (OUTPATIENT)
Age: 56
End: 2021-04-30

## 2021-04-30 ENCOUNTER — LABORATORY RESULT (OUTPATIENT)
Age: 56
End: 2021-04-30

## 2021-04-30 ENCOUNTER — APPOINTMENT (OUTPATIENT)
Dept: INFUSION THERAPY | Facility: HOSPITAL | Age: 56
End: 2021-04-30

## 2021-04-30 ENCOUNTER — RESULT REVIEW (OUTPATIENT)
Age: 56
End: 2021-04-30

## 2021-04-30 LAB
BASOPHILS # BLD AUTO: 0.1 K/UL — SIGNIFICANT CHANGE UP (ref 0–0.2)
BASOPHILS NFR BLD AUTO: 2 % — SIGNIFICANT CHANGE UP (ref 0–2)
BUN SERPL-MCNC: 17 MG/DL — SIGNIFICANT CHANGE UP (ref 7–23)
CA-I BLDA-SCNC: 1.16 MMOL/L — SIGNIFICANT CHANGE UP (ref 1.12–1.3)
CHLORIDE SERPL-SCNC: 97 MMOL/L — SIGNIFICANT CHANGE UP (ref 96–108)
CO2 SERPL-SCNC: 32 MMOL/L — HIGH (ref 22–31)
CREAT SERPL-MCNC: 1.1 MG/DL — SIGNIFICANT CHANGE UP (ref 0.5–1.3)
EOSINOPHIL # BLD AUTO: 0.24 K/UL — SIGNIFICANT CHANGE UP (ref 0–0.5)
EOSINOPHIL NFR BLD AUTO: 5 % — SIGNIFICANT CHANGE UP (ref 0–6)
GLUCOSE SERPL-MCNC: 79 MG/DL — SIGNIFICANT CHANGE UP (ref 70–99)
HCT VFR BLD CALC: 35.4 % — LOW (ref 39–50)
HGB BLD-MCNC: 11.7 G/DL — LOW (ref 13–17)
LYMPHOCYTES # BLD AUTO: 0.72 K/UL — LOW (ref 1–3.3)
LYMPHOCYTES # BLD AUTO: 15 % — SIGNIFICANT CHANGE UP (ref 13–44)
MCHC RBC-ENTMCNC: 29.4 PG — SIGNIFICANT CHANGE UP (ref 27–34)
MCHC RBC-ENTMCNC: 33.1 G/DL — SIGNIFICANT CHANGE UP (ref 32–36)
MCV RBC AUTO: 88.9 FL — SIGNIFICANT CHANGE UP (ref 80–100)
MONOCYTES # BLD AUTO: 1.15 K/UL — HIGH (ref 0–0.9)
MONOCYTES NFR BLD AUTO: 24 % — HIGH (ref 2–14)
NEUTROPHILS # BLD AUTO: 2.59 K/UL — SIGNIFICANT CHANGE UP (ref 1.8–7.4)
NEUTROPHILS NFR BLD AUTO: 54 % — SIGNIFICANT CHANGE UP (ref 43–77)
NRBC # BLD: 0 /100 — SIGNIFICANT CHANGE UP (ref 0–0)
NRBC # BLD: SIGNIFICANT CHANGE UP /100 WBCS (ref 0–0)
PLAT MORPH BLD: NORMAL — SIGNIFICANT CHANGE UP
PLATELET # BLD AUTO: 375 K/UL — SIGNIFICANT CHANGE UP (ref 150–400)
POTASSIUM SERPL-MCNC: 3.9 MMOL/L — SIGNIFICANT CHANGE UP (ref 3.5–5.3)
POTASSIUM SERPL-SCNC: 3.9 MMOL/L — SIGNIFICANT CHANGE UP (ref 3.5–5.3)
RBC # BLD: 3.98 M/UL — LOW (ref 4.2–5.8)
RBC # FLD: 14.2 % — SIGNIFICANT CHANGE UP (ref 10.3–14.5)
RBC BLD AUTO: SIGNIFICANT CHANGE UP
SODIUM SERPL-SCNC: 136 MMOL/L — SIGNIFICANT CHANGE UP (ref 135–145)
WBC # BLD: 4.79 K/UL — SIGNIFICANT CHANGE UP (ref 3.8–10.5)
WBC # FLD AUTO: 4.79 K/UL — SIGNIFICANT CHANGE UP (ref 3.8–10.5)

## 2021-04-30 PROCEDURE — G6002: CPT | Mod: 26

## 2021-05-03 PROCEDURE — G6002: CPT | Mod: 26

## 2021-05-03 NOTE — HISTORY OF PRESENT ILLNESS
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 \par S/p tonsillectomy 03/09/2021, path negative for malignancy.\par Reports weight loss of 22 lb since tonsillectomy. States loss of taste after surgery. \par \par PET/CT 3/9/2021 - Hypermetabolic right neck lesion likely secondary to enlarged hypermetabolic LNs. There is slight asymmetry of the tonsils with slight increased activity on the right versus the left. No hypermetabolic evidence of distant metastatic disease. \par \par Denies dysphagia, odynophagia, dyspnea, dysphonia. Reports right otalgia s/p tonsilectomy. Reports feels like he's underwater. Denies hearing loss.\par He had an audiology exam that was normal.  [de-identified] : Presents for C2 cisplatin today. \par Initially felt well and motivated to receive therapy, however after IVF and antiemetics and prior to chemo being hung he became nervous and had second thoughts about continuing treatment, asked to stop treatment and live the rest of his life as best as he can. \par His Wife was called into treatment room. JEFRY and RN Yissel also at chair side. Wife crying and begging him to continue treatment. \par At the end, he decided to receive this cycle of therapy and talk to family and friends before deciding about continuing or stopping.

## 2021-05-03 NOTE — REVIEW OF SYSTEMS
[Dysphagia] : dysphagia [Odynophagia] : odynophagia [Mucosal Pain] : mucosal pain [Negative] : Allergic/Immunologic

## 2021-05-03 NOTE — PHYSICAL EXAM
[Fully active, able to carry on all pre-disease performance without restriction] : Status 0 - Fully active, able to carry on all pre-disease performance without restriction [Normal] : affect appropriate [de-identified] : large right sided neck mass
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
## 2021-05-04 ENCOUNTER — APPOINTMENT (OUTPATIENT)
Dept: HEMATOLOGY ONCOLOGY | Facility: CLINIC | Age: 56
End: 2021-05-04

## 2021-05-05 PROCEDURE — 77014: CPT | Mod: 26

## 2021-05-06 ENCOUNTER — NON-APPOINTMENT (OUTPATIENT)
Age: 56
End: 2021-05-06

## 2021-05-06 VITALS
RESPIRATION RATE: 16 BRPM | OXYGEN SATURATION: 100 % | TEMPERATURE: 96.8 F | BODY MASS INDEX: 21.12 KG/M2 | DIASTOLIC BLOOD PRESSURE: 91 MMHG | WEIGHT: 160.05 LBS | SYSTOLIC BLOOD PRESSURE: 149 MMHG | HEART RATE: 91 BPM

## 2021-05-06 PROCEDURE — G6002: CPT | Mod: 26

## 2021-05-06 PROCEDURE — 77427 RADIATION TX MANAGEMENT X5: CPT

## 2021-05-06 NOTE — HISTORY OF PRESENT ILLNESS
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 \par Lymph node, right level 2 biopsy positive for squamous cell carcinoma on March 2 2021; reportedly HPV/P16 negative. \par \par Presents today for OTV. Completed 16/35 fx. C/o throat soreness, using magic mixture as prescribed with good relief. Lost 5 lbs this week as per wife he is using PEG. Drinking water only orally.

## 2021-05-06 NOTE — DISEASE MANAGEMENT
[Clinical] : TNM Stage: c [III] : III [FreeTextEntry4] : tonsil sqcc [TTNM] : 2 [NTNM] : 1 [MTNM] : 0 [de-identified] : [id number] [de-identified] : [id number] [de-identified] : Oropharynx/neck

## 2021-05-07 ENCOUNTER — LABORATORY RESULT (OUTPATIENT)
Age: 56
End: 2021-05-07

## 2021-05-07 ENCOUNTER — APPOINTMENT (OUTPATIENT)
Dept: INFUSION THERAPY | Facility: HOSPITAL | Age: 56
End: 2021-05-07

## 2021-05-07 ENCOUNTER — RESULT REVIEW (OUTPATIENT)
Age: 56
End: 2021-05-07

## 2021-05-07 ENCOUNTER — NON-APPOINTMENT (OUTPATIENT)
Age: 56
End: 2021-05-07

## 2021-05-07 LAB
BASOPHILS # BLD AUTO: 0.08 K/UL — SIGNIFICANT CHANGE UP (ref 0–0.2)
BASOPHILS NFR BLD AUTO: 1.7 % — SIGNIFICANT CHANGE UP (ref 0–2)
EOSINOPHIL # BLD AUTO: 0.24 K/UL — SIGNIFICANT CHANGE UP (ref 0–0.5)
EOSINOPHIL NFR BLD AUTO: 5.2 % — SIGNIFICANT CHANGE UP (ref 0–6)
HCT VFR BLD CALC: 34.4 % — LOW (ref 39–50)
HGB BLD-MCNC: 11.7 G/DL — LOW (ref 13–17)
IMM GRANULOCYTES NFR BLD AUTO: 1.3 % — SIGNIFICANT CHANGE UP (ref 0–1.5)
LYMPHOCYTES # BLD AUTO: 0.54 K/UL — LOW (ref 1–3.3)
LYMPHOCYTES # BLD AUTO: 11.6 % — LOW (ref 13–44)
MCHC RBC-ENTMCNC: 29.5 PG — SIGNIFICANT CHANGE UP (ref 27–34)
MCHC RBC-ENTMCNC: 34 G/DL — SIGNIFICANT CHANGE UP (ref 32–36)
MCV RBC AUTO: 86.6 FL — SIGNIFICANT CHANGE UP (ref 80–100)
MONOCYTES # BLD AUTO: 0.93 K/UL — HIGH (ref 0–0.9)
MONOCYTES NFR BLD AUTO: 20 % — HIGH (ref 2–14)
NEUTROPHILS # BLD AUTO: 2.81 K/UL — SIGNIFICANT CHANGE UP (ref 1.8–7.4)
NEUTROPHILS NFR BLD AUTO: 60.2 % — SIGNIFICANT CHANGE UP (ref 43–77)
NRBC # BLD: 0 /100 WBCS — SIGNIFICANT CHANGE UP (ref 0–0)
PLATELET # BLD AUTO: 304 K/UL — SIGNIFICANT CHANGE UP (ref 150–400)
RBC # BLD: 3.97 M/UL — LOW (ref 4.2–5.8)
RBC # FLD: 14.1 % — SIGNIFICANT CHANGE UP (ref 10.3–14.5)
WBC # BLD: 4.66 K/UL — SIGNIFICANT CHANGE UP (ref 3.8–10.5)
WBC # FLD AUTO: 4.66 K/UL — SIGNIFICANT CHANGE UP (ref 3.8–10.5)

## 2021-05-07 PROCEDURE — G6002: CPT | Mod: 26

## 2021-05-10 PROCEDURE — G6002: CPT | Mod: 26

## 2021-05-11 ENCOUNTER — APPOINTMENT (OUTPATIENT)
Dept: RADIOLOGY | Facility: IMAGING CENTER | Age: 56
End: 2021-05-11

## 2021-05-11 ENCOUNTER — LABORATORY RESULT (OUTPATIENT)
Age: 56
End: 2021-05-11

## 2021-05-11 ENCOUNTER — APPOINTMENT (OUTPATIENT)
Dept: INFUSION THERAPY | Facility: HOSPITAL | Age: 56
End: 2021-05-11

## 2021-05-11 ENCOUNTER — APPOINTMENT (OUTPATIENT)
Dept: HEMATOLOGY ONCOLOGY | Facility: CLINIC | Age: 56
End: 2021-05-11
Payer: MEDICAID

## 2021-05-11 ENCOUNTER — RESULT REVIEW (OUTPATIENT)
Age: 56
End: 2021-05-11

## 2021-05-11 VITALS
HEART RATE: 85 BPM | OXYGEN SATURATION: 96 % | TEMPERATURE: 96 F | SYSTOLIC BLOOD PRESSURE: 117 MMHG | WEIGHT: 155.65 LBS | DIASTOLIC BLOOD PRESSURE: 81 MMHG | HEIGHT: 72 IN | RESPIRATION RATE: 16 BRPM | BODY MASS INDEX: 21.08 KG/M2

## 2021-05-11 DIAGNOSIS — Z79.899 OTHER LONG TERM (CURRENT) DRUG THERAPY: ICD-10-CM

## 2021-05-11 DIAGNOSIS — C79.89 SECONDARY MALIGNANT NEOPLASM OF OTHER SPECIFIED SITES: ICD-10-CM

## 2021-05-11 DIAGNOSIS — S69.92XS UNSPECIFIED INJURY OF LEFT WRIST, HAND AND FINGER(S), SEQUELA: ICD-10-CM

## 2021-05-11 DIAGNOSIS — C80.1 SECONDARY MALIGNANT NEOPLASM OF OTHER SPECIFIED SITES: ICD-10-CM

## 2021-05-11 DIAGNOSIS — S69.90XA UNSPECIFIED INJURY OF UNSPECIFIED WRIST, HAND AND FINGER(S), INITIAL ENCOUNTER: ICD-10-CM

## 2021-05-11 LAB
BUN SERPL-MCNC: 18 MG/DL — SIGNIFICANT CHANGE UP (ref 7–23)
CA-I BLDA-SCNC: 1.13 MMOL/L — SIGNIFICANT CHANGE UP (ref 1.12–1.3)
CHLORIDE SERPL-SCNC: 97 MMOL/L — SIGNIFICANT CHANGE UP (ref 96–108)
CO2 SERPL-SCNC: 31 MMOL/L — SIGNIFICANT CHANGE UP (ref 22–31)
CREAT SERPL-MCNC: 1.3 MG/DL — SIGNIFICANT CHANGE UP (ref 0.5–1.3)
GLUCOSE SERPL-MCNC: 84 MG/DL — SIGNIFICANT CHANGE UP (ref 70–99)
POTASSIUM SERPL-MCNC: 3.8 MMOL/L — SIGNIFICANT CHANGE UP (ref 3.5–5.3)
POTASSIUM SERPL-SCNC: 3.8 MMOL/L — SIGNIFICANT CHANGE UP (ref 3.5–5.3)
SODIUM SERPL-SCNC: 136 MMOL/L — SIGNIFICANT CHANGE UP (ref 135–145)

## 2021-05-11 PROCEDURE — G6002: CPT | Mod: 26

## 2021-05-11 PROCEDURE — 99215 OFFICE O/P EST HI 40 MIN: CPT

## 2021-05-11 NOTE — HISTORY OF PRESENT ILLNESS
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 \par Lymph node, right level 2 biopsy positive for squamous cell carcinoma on March 2 2021; reportedly HPV/P16 negative. \par \par Presents today for OTV. Completed 19/35 fx. C/o throat soreness, using magic mixture as prescribed with good relief. Using PEG. C/o nausea, vomiting and diarrhea due to chemo. 5 lb weight loss noted.  Drinking water only orally.

## 2021-05-11 NOTE — DISEASE MANAGEMENT
[Clinical] : TNM Stage: c [FreeTextEntry4] : tonsil sqcc [TTNM] : 2 [MTNM] : 0 [NTNM] : 1 [III] : III [de-identified] : 9920 [de-identified] : [id number] [de-identified] : Oropharynx/neck

## 2021-05-11 NOTE — REVIEW OF SYSTEMS
[Diarrhea: Grade 1 - Increase of <4 stools per day over baseline; mild increase in ostomy output compared to baseline] : Diarrhea: Grade 1 - Increase of <4 stools per day over baseline; mild increase in ostomy output compared to baseline [Nausea: Grade 1 - Loss of appetite without alteration in eating habits] : Nausea: Grade 1 - Loss of appetite without alteration in eating habits [Dysphagia: Grade 1 - Symptomatic, able to eat regular diet] : Dysphagia: Grade 1 - Symptomatic, able to eat regular diet [Vomiting: Grade 1 - 1 - 2 episodes ( by 5 minutes) in 24 hrs] : Vomiting: Grade 1 - 1 - 2 episodes ( by 5 minutes) in 24 hrs [Fatigue: Grade 1 - Fatigue relieved by rest] : Fatigue: Grade 1 - Fatigue relieved by rest [Mucositis Oral: Grade 0] : Mucositis Oral: Grade 0  [Xerostomia: Grade 1 - Symptomatic (e.g., dry or thick saliva) without significant dietary alteration; unstimulated saliva flow >0.2 ml/min] : Xerostomia: Grade 1 - Symptomatic (e.g., dry or thick saliva) without significant dietary alteration; unstimulated saliva flow >0.2 ml/min [Oral Pain: Grade 0] : Oral Pain: Grade 0 [Dysgeusia: Grade 2 - Altered taste with change in diet (e.g., oral supplements); noxious or unpleasant taste; loss of taste] : Dysgeusia: Grade 2 - Altered taste with change in diet (e.g., oral supplements); noxious or unpleasant taste; loss of taste [Cough: Grade 0] : Cough: Grade 0 [Hoarseness: Grade 0] : Hoarseness: Grade 0 [Skin Hyperpigmentation: Grade 1 - Hyperpigmentation covering <10% BSA; no psychosocial impact] : Skin Hyperpigmentation: Grade 1 - Hyperpigmentation covering <10% BSA; no psychosocial impact [Dermatitis Radiation: Grade 1 - Faint erythema or dry desquamation] : Dermatitis Radiation: Grade 1 - Faint erythema or dry desquamation

## 2021-05-12 ENCOUNTER — NON-APPOINTMENT (OUTPATIENT)
Age: 56
End: 2021-05-12

## 2021-05-12 PROCEDURE — 77014: CPT | Mod: 26

## 2021-05-13 PROCEDURE — 77427 RADIATION TX MANAGEMENT X5: CPT

## 2021-05-13 PROCEDURE — G6002: CPT | Mod: 26

## 2021-05-14 ENCOUNTER — NON-APPOINTMENT (OUTPATIENT)
Age: 56
End: 2021-05-14

## 2021-05-14 ENCOUNTER — RESULT REVIEW (OUTPATIENT)
Age: 56
End: 2021-05-14

## 2021-05-14 ENCOUNTER — APPOINTMENT (OUTPATIENT)
Dept: INFUSION THERAPY | Facility: HOSPITAL | Age: 56
End: 2021-05-14

## 2021-05-14 ENCOUNTER — LABORATORY RESULT (OUTPATIENT)
Age: 56
End: 2021-05-14

## 2021-05-14 PROBLEM — C79.89 SECONDARY SQUAMOUS CELL CARCINOMA OF HEAD AND NECK WITH UNKNOWN PRIMARY SITE: Status: ACTIVE | Noted: 2021-04-02

## 2021-05-14 PROBLEM — Z79.899 ON ANTINEOPLASTIC CHEMOTHERAPY: Status: ACTIVE | Noted: 2021-04-16

## 2021-05-14 LAB
BASOPHILS # BLD AUTO: 0.05 K/UL — SIGNIFICANT CHANGE UP (ref 0–0.2)
BASOPHILS NFR BLD AUTO: 1.2 % — SIGNIFICANT CHANGE UP (ref 0–2)
EOSINOPHIL # BLD AUTO: 0.33 K/UL — SIGNIFICANT CHANGE UP (ref 0–0.5)
EOSINOPHIL NFR BLD AUTO: 8.1 % — HIGH (ref 0–6)
HCT VFR BLD CALC: 32.6 % — LOW (ref 39–50)
HGB BLD-MCNC: 11.2 G/DL — LOW (ref 13–17)
IMM GRANULOCYTES NFR BLD AUTO: 0.5 % — SIGNIFICANT CHANGE UP (ref 0–1.5)
LYMPHOCYTES # BLD AUTO: 0.37 K/UL — LOW (ref 1–3.3)
LYMPHOCYTES # BLD AUTO: 9 % — LOW (ref 13–44)
MCHC RBC-ENTMCNC: 29.7 PG — SIGNIFICANT CHANGE UP (ref 27–34)
MCHC RBC-ENTMCNC: 34.4 G/DL — SIGNIFICANT CHANGE UP (ref 32–36)
MCV RBC AUTO: 86.5 FL — SIGNIFICANT CHANGE UP (ref 80–100)
MONOCYTES # BLD AUTO: 0.78 K/UL — SIGNIFICANT CHANGE UP (ref 0–0.9)
MONOCYTES NFR BLD AUTO: 19.1 % — HIGH (ref 2–14)
NEUTROPHILS # BLD AUTO: 2.54 K/UL — SIGNIFICANT CHANGE UP (ref 1.8–7.4)
NEUTROPHILS NFR BLD AUTO: 62.1 % — SIGNIFICANT CHANGE UP (ref 43–77)
NRBC # BLD: 0 /100 WBCS — SIGNIFICANT CHANGE UP (ref 0–0)
PLATELET # BLD AUTO: 217 K/UL — SIGNIFICANT CHANGE UP (ref 150–400)
RBC # BLD: 3.77 M/UL — LOW (ref 4.2–5.8)
RBC # FLD: 13.9 % — SIGNIFICANT CHANGE UP (ref 10.3–14.5)
WBC # BLD: 4.09 K/UL — SIGNIFICANT CHANGE UP (ref 3.8–10.5)
WBC # FLD AUTO: 4.09 K/UL — SIGNIFICANT CHANGE UP (ref 3.8–10.5)

## 2021-05-14 PROCEDURE — G6002: CPT | Mod: 26

## 2021-05-14 RX ORDER — LORAZEPAM 0.5 MG/1
0.5 TABLET ORAL EVERY 6 HOURS
Qty: 40 | Refills: 0 | Status: ACTIVE | COMMUNITY
Start: 2021-05-14 | End: 1900-01-01
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
## 2021-05-14 NOTE — PHYSICAL EXAM
[Fully active, able to carry on all pre-disease performance without restriction] : Status 0 - Fully active, able to carry on all pre-disease performance without restriction [Normal] : affect appropriate [de-identified] : Right sided neck mass is much smaller

## 2021-05-14 NOTE — HISTORY OF PRESENT ILLNESS
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 \par S/p tonsillectomy 03/09/2021, path negative for malignancy.\par Reports weight loss of 22 lb since tonsillectomy. States loss of taste after surgery. \par \par PET/CT 3/9/2021 - Hypermetabolic right neck lesion likely secondary to enlarged hypermetabolic LNs. There is slight asymmetry of the tonsils with slight increased activity on the right versus the left. No hypermetabolic evidence of distant metastatic disease. \par \par Denies dysphagia, odynophagia, dyspnea, dysphonia. Reports right otalgia s/p tonsilectomy. Reports feels like he's underwater. Denies hearing loss.\par He had an audiology exam that was normal.  [de-identified] : s/p 3 cycles of cisplatin . Has been having a lot of nausea and vomiting. Cr rise to 1.3. \par Reports episode of sharking chills last night that self resolved. \par Slipped in showed and held on to handle bar to prevent falling and right wrist is swollen.

## 2021-05-14 NOTE — REVIEW OF SYSTEMS
[Chills] : chills [Fatigue] : fatigue [Odynophagia] : odynophagia [Mucosal Pain] : mucosal pain [Negative] : Allergic/Immunologic

## 2021-05-17 PROCEDURE — G6002: CPT | Mod: 26

## 2021-05-18 ENCOUNTER — RESULT REVIEW (OUTPATIENT)
Age: 56
End: 2021-05-18

## 2021-05-18 ENCOUNTER — APPOINTMENT (OUTPATIENT)
Dept: INFUSION THERAPY | Facility: HOSPITAL | Age: 56
End: 2021-05-18

## 2021-05-18 ENCOUNTER — LABORATORY RESULT (OUTPATIENT)
Age: 56
End: 2021-05-18

## 2021-05-18 ENCOUNTER — APPOINTMENT (OUTPATIENT)
Dept: HEMATOLOGY ONCOLOGY | Facility: CLINIC | Age: 56
End: 2021-05-18

## 2021-05-18 LAB
BASOPHILS # BLD AUTO: 0.03 K/UL — SIGNIFICANT CHANGE UP (ref 0–0.2)
BASOPHILS NFR BLD AUTO: 0.9 % — SIGNIFICANT CHANGE UP (ref 0–2)
BUN SERPL-MCNC: 21 MG/DL — SIGNIFICANT CHANGE UP (ref 7–23)
CA-I BLDA-SCNC: 1.26 MMOL/L — SIGNIFICANT CHANGE UP (ref 1.12–1.3)
CHLORIDE SERPL-SCNC: 99 MMOL/L — SIGNIFICANT CHANGE UP (ref 96–108)
CO2 SERPL-SCNC: 28 MMOL/L — SIGNIFICANT CHANGE UP (ref 22–31)
CREAT SERPL-MCNC: 1.3 MG/DL — SIGNIFICANT CHANGE UP (ref 0.5–1.3)
EOSINOPHIL # BLD AUTO: 0.26 K/UL — SIGNIFICANT CHANGE UP (ref 0–0.5)
EOSINOPHIL NFR BLD AUTO: 7.4 % — HIGH (ref 0–6)
GLUCOSE SERPL-MCNC: 70 MG/DL — SIGNIFICANT CHANGE UP (ref 70–99)
HCT VFR BLD CALC: 33.3 % — LOW (ref 39–50)
HGB BLD-MCNC: 11 G/DL — LOW (ref 13–17)
IMM GRANULOCYTES NFR BLD AUTO: 0.3 % — SIGNIFICANT CHANGE UP (ref 0–1.5)
LYMPHOCYTES # BLD AUTO: 0.38 K/UL — LOW (ref 1–3.3)
LYMPHOCYTES # BLD AUTO: 10.8 % — LOW (ref 13–44)
MCHC RBC-ENTMCNC: 29.4 PG — SIGNIFICANT CHANGE UP (ref 27–34)
MCHC RBC-ENTMCNC: 33 G/DL — SIGNIFICANT CHANGE UP (ref 32–36)
MCV RBC AUTO: 89 FL — SIGNIFICANT CHANGE UP (ref 80–100)
MONOCYTES # BLD AUTO: 0.67 K/UL — SIGNIFICANT CHANGE UP (ref 0–0.9)
MONOCYTES NFR BLD AUTO: 19 % — HIGH (ref 2–14)
NEUTROPHILS # BLD AUTO: 2.17 K/UL — SIGNIFICANT CHANGE UP (ref 1.8–7.4)
NEUTROPHILS NFR BLD AUTO: 61.6 % — SIGNIFICANT CHANGE UP (ref 43–77)
NRBC # BLD: 0 /100 WBCS — SIGNIFICANT CHANGE UP (ref 0–0)
PLATELET # BLD AUTO: 205 K/UL — SIGNIFICANT CHANGE UP (ref 150–400)
POTASSIUM SERPL-MCNC: 4.9 MMOL/L — SIGNIFICANT CHANGE UP (ref 3.5–5.3)
POTASSIUM SERPL-SCNC: 4.9 MMOL/L — SIGNIFICANT CHANGE UP (ref 3.5–5.3)
RBC # BLD: 3.74 M/UL — LOW (ref 4.2–5.8)
RBC # FLD: 14.1 % — SIGNIFICANT CHANGE UP (ref 10.3–14.5)
SODIUM SERPL-SCNC: 137 MMOL/L — SIGNIFICANT CHANGE UP (ref 135–145)
WBC # BLD: 3.52 K/UL — LOW (ref 3.8–10.5)
WBC # FLD AUTO: 3.52 K/UL — LOW (ref 3.8–10.5)

## 2021-05-18 PROCEDURE — G6002: CPT | Mod: 26

## 2021-05-19 ENCOUNTER — NON-APPOINTMENT (OUTPATIENT)
Age: 56
End: 2021-05-19

## 2021-05-19 VITALS
BODY MASS INDEX: 21.2 KG/M2 | DIASTOLIC BLOOD PRESSURE: 80 MMHG | SYSTOLIC BLOOD PRESSURE: 120 MMHG | HEART RATE: 63 BPM | RESPIRATION RATE: 16 BRPM | OXYGEN SATURATION: 100 % | WEIGHT: 156.31 LBS

## 2021-05-19 PROCEDURE — 77014: CPT | Mod: 26

## 2021-05-19 NOTE — REVIEW OF SYSTEMS
[Fatigue: Grade 1 - Fatigue relieved by rest] : Fatigue: Grade 1 - Fatigue relieved by rest [Mucositis Oral: Grade 0] : Mucositis Oral: Grade 0  [Xerostomia: Grade 1 - Symptomatic (e.g., dry or thick saliva) without significant dietary alteration; unstimulated saliva flow >0.2 ml/min] : Xerostomia: Grade 1 - Symptomatic (e.g., dry or thick saliva) without significant dietary alteration; unstimulated saliva flow >0.2 ml/min [Oral Pain: Grade 0] : Oral Pain: Grade 0 [Dysgeusia: Grade 2 - Altered taste with change in diet (e.g., oral supplements); noxious or unpleasant taste; loss of taste] : Dysgeusia: Grade 2 - Altered taste with change in diet (e.g., oral supplements); noxious or unpleasant taste; loss of taste [Cough: Grade 0] : Cough: Grade 0 [Hoarseness: Grade 0] : Hoarseness: Grade 0 [Skin Hyperpigmentation: Grade 1 - Hyperpigmentation covering <10% BSA; no psychosocial impact] : Skin Hyperpigmentation: Grade 1 - Hyperpigmentation covering <10% BSA; no psychosocial impact [Dermatitis Radiation: Grade 1 - Faint erythema or dry desquamation] : Dermatitis Radiation: Grade 1 - Faint erythema or dry desquamation [Diarrhea: Grade 0] : Diarrhea: Grade 0 [Nausea: Grade 0] : Nausea: Grade 0 [Vomiting: Grade 0] : Vomiting: Grade 0 [FreeTextEntry6] : PEG

## 2021-05-19 NOTE — HISTORY OF PRESENT ILLNESS
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 \par Lymph node, right level 2 biopsy positive for squamous cell carcinoma on March 2 2021; reportedly HPV/P16 negative. \par \par Presents today for OTV. Completed 25/35 fx. C/o throat soreness, using magic mixture as prescribed with good relief. Using PEG. Maintaining weight.

## 2021-05-19 NOTE — DISEASE MANAGEMENT
[Clinical] : TNM Stage: c [III] : III [FreeTextEntry4] : tonsil sqcc [TTNM] : 2 [NTNM] : 1 [MTNM] : 0 [de-identified] : [id number] [de-identified] : 1187 [de-identified] : Oropharynx/neck

## 2021-05-20 PROCEDURE — G6002: CPT | Mod: 26

## 2021-05-20 PROCEDURE — 77427 RADIATION TX MANAGEMENT X5: CPT

## 2021-05-21 ENCOUNTER — RESULT REVIEW (OUTPATIENT)
Age: 56
End: 2021-05-21

## 2021-05-21 ENCOUNTER — APPOINTMENT (OUTPATIENT)
Dept: INFUSION THERAPY | Facility: HOSPITAL | Age: 56
End: 2021-05-21

## 2021-05-21 ENCOUNTER — LABORATORY RESULT (OUTPATIENT)
Age: 56
End: 2021-05-21

## 2021-05-21 LAB
BASOPHILS # BLD AUTO: 0.03 K/UL — SIGNIFICANT CHANGE UP (ref 0–0.2)
BASOPHILS NFR BLD AUTO: 1 % — SIGNIFICANT CHANGE UP (ref 0–2)
EOSINOPHIL # BLD AUTO: 0.29 K/UL — SIGNIFICANT CHANGE UP (ref 0–0.5)
EOSINOPHIL NFR BLD AUTO: 9.3 % — HIGH (ref 0–6)
HCT VFR BLD CALC: 31.7 % — LOW (ref 39–50)
HGB BLD-MCNC: 10.7 G/DL — LOW (ref 13–17)
IMM GRANULOCYTES NFR BLD AUTO: 0.6 % — SIGNIFICANT CHANGE UP (ref 0–1.5)
LYMPHOCYTES # BLD AUTO: 0.31 K/UL — LOW (ref 1–3.3)
LYMPHOCYTES # BLD AUTO: 9.9 % — LOW (ref 13–44)
MCHC RBC-ENTMCNC: 29.2 PG — SIGNIFICANT CHANGE UP (ref 27–34)
MCHC RBC-ENTMCNC: 33.8 G/DL — SIGNIFICANT CHANGE UP (ref 32–36)
MCV RBC AUTO: 86.6 FL — SIGNIFICANT CHANGE UP (ref 80–100)
MONOCYTES # BLD AUTO: 0.44 K/UL — SIGNIFICANT CHANGE UP (ref 0–0.9)
MONOCYTES NFR BLD AUTO: 14.1 % — HIGH (ref 2–14)
NEUTROPHILS # BLD AUTO: 2.03 K/UL — SIGNIFICANT CHANGE UP (ref 1.8–7.4)
NEUTROPHILS NFR BLD AUTO: 65.1 % — SIGNIFICANT CHANGE UP (ref 43–77)
NRBC # BLD: 0 /100 WBCS — SIGNIFICANT CHANGE UP (ref 0–0)
PLATELET # BLD AUTO: 188 K/UL — SIGNIFICANT CHANGE UP (ref 150–400)
RBC # BLD: 3.66 M/UL — LOW (ref 4.2–5.8)
RBC # FLD: 14.2 % — SIGNIFICANT CHANGE UP (ref 10.3–14.5)
WBC # BLD: 3.12 K/UL — LOW (ref 3.8–10.5)
WBC # FLD AUTO: 3.12 K/UL — LOW (ref 3.8–10.5)

## 2021-05-21 PROCEDURE — G6002: CPT | Mod: 26

## 2021-05-24 ENCOUNTER — OUTPATIENT (OUTPATIENT)
Dept: OUTPATIENT SERVICES | Facility: HOSPITAL | Age: 56
LOS: 1 days | Discharge: HOME | End: 2021-05-24

## 2021-05-24 DIAGNOSIS — Z98.89 OTHER SPECIFIED POSTPROCEDURAL STATES: Chronic | ICD-10-CM

## 2021-05-24 DIAGNOSIS — C09.9 MALIGNANT NEOPLASM OF TONSIL, UNSPECIFIED: ICD-10-CM

## 2021-05-24 PROCEDURE — G6002: CPT | Mod: 26

## 2021-05-25 ENCOUNTER — LABORATORY RESULT (OUTPATIENT)
Age: 56
End: 2021-05-25

## 2021-05-25 ENCOUNTER — RESULT REVIEW (OUTPATIENT)
Age: 56
End: 2021-05-25

## 2021-05-25 ENCOUNTER — NON-APPOINTMENT (OUTPATIENT)
Age: 56
End: 2021-05-25

## 2021-05-25 ENCOUNTER — APPOINTMENT (OUTPATIENT)
Dept: HEMATOLOGY ONCOLOGY | Facility: CLINIC | Age: 56
End: 2021-05-25

## 2021-05-25 ENCOUNTER — APPOINTMENT (OUTPATIENT)
Dept: INFUSION THERAPY | Facility: HOSPITAL | Age: 56
End: 2021-05-25

## 2021-05-25 DIAGNOSIS — R11.2 NAUSEA WITH VOMITING, UNSPECIFIED: ICD-10-CM

## 2021-05-25 DIAGNOSIS — E86.0 DEHYDRATION: ICD-10-CM

## 2021-05-25 LAB
BASOPHILS # BLD AUTO: 0.02 K/UL — SIGNIFICANT CHANGE UP (ref 0–0.2)
BASOPHILS NFR BLD AUTO: 1 % — SIGNIFICANT CHANGE UP (ref 0–2)
EOSINOPHIL # BLD AUTO: 0.19 K/UL — SIGNIFICANT CHANGE UP (ref 0–0.5)
EOSINOPHIL NFR BLD AUTO: 8 % — HIGH (ref 0–6)
HCT VFR BLD CALC: 28.4 % — LOW (ref 39–50)
HGB BLD-MCNC: 9.4 G/DL — LOW (ref 13–17)
LYMPHOCYTES # BLD AUTO: 0.19 K/UL — LOW (ref 1–3.3)
LYMPHOCYTES # BLD AUTO: 8 % — LOW (ref 13–44)
MCHC RBC-ENTMCNC: 29.8 PG — SIGNIFICANT CHANGE UP (ref 27–34)
MCHC RBC-ENTMCNC: 33.1 G/DL — SIGNIFICANT CHANGE UP (ref 32–36)
MCV RBC AUTO: 90.2 FL — SIGNIFICANT CHANGE UP (ref 80–100)
MONOCYTES # BLD AUTO: 0.32 K/UL — SIGNIFICANT CHANGE UP (ref 0–0.9)
MONOCYTES NFR BLD AUTO: 14 % — SIGNIFICANT CHANGE UP (ref 2–14)
NEUTROPHILS # BLD AUTO: 1.6 K/UL — LOW (ref 1.8–7.4)
NEUTROPHILS NFR BLD AUTO: 69 % — SIGNIFICANT CHANGE UP (ref 43–77)
NRBC # BLD: 0 /100 — SIGNIFICANT CHANGE UP (ref 0–0)
NRBC # BLD: SIGNIFICANT CHANGE UP /100 WBCS (ref 0–0)
PLAT MORPH BLD: NORMAL — SIGNIFICANT CHANGE UP
PLATELET # BLD AUTO: 181 K/UL — SIGNIFICANT CHANGE UP (ref 150–400)
RBC # BLD: 3.15 M/UL — LOW (ref 4.2–5.8)
RBC # FLD: 14.5 % — SIGNIFICANT CHANGE UP (ref 10.3–14.5)
RBC BLD AUTO: SIGNIFICANT CHANGE UP
WBC # BLD: 2.32 K/UL — LOW (ref 3.8–10.5)
WBC # FLD AUTO: 2.32 K/UL — LOW (ref 3.8–10.5)

## 2021-05-25 PROCEDURE — G6002: CPT | Mod: 26

## 2021-05-26 PROCEDURE — 77014: CPT | Mod: 26

## 2021-05-27 ENCOUNTER — NON-APPOINTMENT (OUTPATIENT)
Age: 56
End: 2021-05-27

## 2021-05-27 VITALS
OXYGEN SATURATION: 100 % | DIASTOLIC BLOOD PRESSURE: 87 MMHG | SYSTOLIC BLOOD PRESSURE: 132 MMHG | WEIGHT: 151.9 LBS | BODY MASS INDEX: 20.6 KG/M2 | RESPIRATION RATE: 16 BRPM | HEART RATE: 71 BPM

## 2021-05-27 PROCEDURE — G6002: CPT | Mod: 26

## 2021-05-27 PROCEDURE — 77427 RADIATION TX MANAGEMENT X5: CPT

## 2021-05-27 RX ORDER — GABAPENTIN 250 MG/5ML
250 SOLUTION ORAL
Qty: 280 | Refills: 1 | Status: ACTIVE | COMMUNITY
Start: 2021-05-27 | End: 1900-01-01

## 2021-05-28 ENCOUNTER — APPOINTMENT (OUTPATIENT)
Dept: INFUSION THERAPY | Facility: HOSPITAL | Age: 56
End: 2021-05-28

## 2021-05-28 ENCOUNTER — LABORATORY RESULT (OUTPATIENT)
Age: 56
End: 2021-05-28

## 2021-05-28 DIAGNOSIS — Z51.11 ENCOUNTER FOR ANTINEOPLASTIC CHEMOTHERAPY: ICD-10-CM

## 2021-05-28 PROCEDURE — G6002: CPT | Mod: 26

## 2021-06-01 ENCOUNTER — LABORATORY RESULT (OUTPATIENT)
Age: 56
End: 2021-06-01

## 2021-06-02 ENCOUNTER — APPOINTMENT (OUTPATIENT)
Dept: INFUSION THERAPY | Facility: HOSPITAL | Age: 56
End: 2021-06-02

## 2021-06-02 ENCOUNTER — LABORATORY RESULT (OUTPATIENT)
Age: 56
End: 2021-06-02

## 2021-06-02 ENCOUNTER — RESULT REVIEW (OUTPATIENT)
Age: 56
End: 2021-06-02

## 2021-06-02 VITALS
RESPIRATION RATE: 16 BRPM | SYSTOLIC BLOOD PRESSURE: 113 MMHG | WEIGHT: 152.34 LBS | OXYGEN SATURATION: 99 % | BODY MASS INDEX: 20.66 KG/M2 | HEART RATE: 76 BPM | DIASTOLIC BLOOD PRESSURE: 82 MMHG

## 2021-06-02 LAB
BASOPHILS # BLD AUTO: 0.03 K/UL — SIGNIFICANT CHANGE UP (ref 0–0.2)
BASOPHILS NFR BLD AUTO: 1 % — SIGNIFICANT CHANGE UP (ref 0–2)
EOSINOPHIL # BLD AUTO: 0.11 K/UL — SIGNIFICANT CHANGE UP (ref 0–0.5)
EOSINOPHIL NFR BLD AUTO: 3.6 % — SIGNIFICANT CHANGE UP (ref 0–6)
HCT VFR BLD CALC: 28.1 % — LOW (ref 39–50)
HGB BLD-MCNC: 9.3 G/DL — LOW (ref 13–17)
IMM GRANULOCYTES NFR BLD AUTO: 0.3 % — SIGNIFICANT CHANGE UP (ref 0–1.5)
LYMPHOCYTES # BLD AUTO: 0.55 K/UL — LOW (ref 1–3.3)
LYMPHOCYTES # BLD AUTO: 18.2 % — SIGNIFICANT CHANGE UP (ref 13–44)
MCHC RBC-ENTMCNC: 29.3 PG — SIGNIFICANT CHANGE UP (ref 27–34)
MCHC RBC-ENTMCNC: 33.1 G/DL — SIGNIFICANT CHANGE UP (ref 32–36)
MCV RBC AUTO: 88.6 FL — SIGNIFICANT CHANGE UP (ref 80–100)
MONOCYTES # BLD AUTO: 0.76 K/UL — SIGNIFICANT CHANGE UP (ref 0–0.9)
MONOCYTES NFR BLD AUTO: 25.1 % — HIGH (ref 2–14)
NEUTROPHILS # BLD AUTO: 1.57 K/UL — LOW (ref 1.8–7.4)
NEUTROPHILS NFR BLD AUTO: 51.8 % — SIGNIFICANT CHANGE UP (ref 43–77)
NRBC # BLD: 0 /100 WBCS — SIGNIFICANT CHANGE UP (ref 0–0)
PLATELET # BLD AUTO: 227 K/UL — SIGNIFICANT CHANGE UP (ref 150–400)
RBC # BLD: 3.17 M/UL — LOW (ref 4.2–5.8)
RBC # FLD: 14.7 % — HIGH (ref 10.3–14.5)
WBC # BLD: 3.03 K/UL — LOW (ref 3.8–10.5)
WBC # FLD AUTO: 3.03 K/UL — LOW (ref 3.8–10.5)

## 2021-06-02 PROCEDURE — 77014: CPT | Mod: 26

## 2021-06-02 NOTE — REVIEW OF SYSTEMS
[Diarrhea: Grade 0] : Diarrhea: Grade 0 [Nausea: Grade 1 - Loss of appetite without alteration in eating habits] : Nausea: Grade 1 - Loss of appetite without alteration in eating habits [Vomiting: Grade 0] : Vomiting: Grade 0 [FreeTextEntry6] : PEG [Fatigue: Grade 1 - Fatigue relieved by rest] : Fatigue: Grade 1 - Fatigue relieved by rest [Mucositis Oral: Grade 0] : Mucositis Oral: Grade 0  [Xerostomia: Grade 1 - Symptomatic (e.g., dry or thick saliva) without significant dietary alteration; unstimulated saliva flow >0.2 ml/min] : Xerostomia: Grade 1 - Symptomatic (e.g., dry or thick saliva) without significant dietary alteration; unstimulated saliva flow >0.2 ml/min [Oral Pain: Grade 0] : Oral Pain: Grade 0 [Dysgeusia: Grade 2 - Altered taste with change in diet (e.g., oral supplements); noxious or unpleasant taste; loss of taste] : Dysgeusia: Grade 2 - Altered taste with change in diet (e.g., oral supplements); noxious or unpleasant taste; loss of taste [Cough: Grade 0] : Cough: Grade 0 [Hoarseness: Grade 0] : Hoarseness: Grade 0 [Skin Hyperpigmentation: Grade 1 - Hyperpigmentation covering <10% BSA; no psychosocial impact] : Skin Hyperpigmentation: Grade 1 - Hyperpigmentation covering <10% BSA; no psychosocial impact [Dermatitis Radiation: Grade 1 - Faint erythema or dry desquamation] : Dermatitis Radiation: Grade 1 - Faint erythema or dry desquamation

## 2021-06-02 NOTE — DISEASE MANAGEMENT
[Clinical] : TNM Stage: c [FreeTextEntry4] : tonsil sqcc [TTNM] : 2 [NTNM] : 1 [MTNM] : 0 [III] : III [de-identified] : [id number] [de-identified] : 5534 [de-identified] : Oropharynx/neck

## 2021-06-02 NOTE — HISTORY OF PRESENT ILLNESS
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 \par Lymph node, right level 2 biopsy positive for squamous cell carcinoma on March 2 2021; reportedly HPV/P16 negative. \par \par Presents today for OTV. Completed 33/35 fx. C/o throat soreness, using magic mixture as prescribed with some relief. Using PEG. Gained a pound. No c/o nausea or vomiting.

## 2021-06-03 ENCOUNTER — NON-APPOINTMENT (OUTPATIENT)
Age: 56
End: 2021-06-03

## 2021-06-03 PROCEDURE — G6002: CPT | Mod: 26

## 2021-06-04 ENCOUNTER — NON-APPOINTMENT (OUTPATIENT)
Age: 56
End: 2021-06-04

## 2021-06-04 ENCOUNTER — APPOINTMENT (OUTPATIENT)
Dept: INFUSION THERAPY | Facility: HOSPITAL | Age: 56
End: 2021-06-04

## 2021-06-04 PROCEDURE — 77427 RADIATION TX MANAGEMENT X5: CPT

## 2021-06-04 PROCEDURE — G6002: CPT | Mod: 26

## 2021-06-10 ENCOUNTER — APPOINTMENT (OUTPATIENT)
Dept: HEMATOLOGY ONCOLOGY | Facility: CLINIC | Age: 56
End: 2021-06-10

## 2021-06-10 ENCOUNTER — NON-APPOINTMENT (OUTPATIENT)
Age: 56
End: 2021-06-10

## 2021-06-11 ENCOUNTER — NON-APPOINTMENT (OUTPATIENT)
Age: 56
End: 2021-06-11

## 2021-06-17 ENCOUNTER — NON-APPOINTMENT (OUTPATIENT)
Age: 56
End: 2021-06-17

## 2021-06-27 NOTE — DISEASE MANAGEMENT
[Clinical] : TNM Stage: c [III] : III [FreeTextEntry4] : tonsil sqcc [TTNM] : 2 [NTNM] : 1 [MTNM] : 0 [de-identified] : 0348 [de-identified] : 2431 [de-identified] : Oropharynx/neck

## 2021-06-27 NOTE — REVIEW OF SYSTEMS
[Diarrhea: Grade 0] : Diarrhea: Grade 0 [Vomiting: Grade 0] : Vomiting: Grade 0 [Fatigue: Grade 1 - Fatigue relieved by rest] : Fatigue: Grade 1 - Fatigue relieved by rest [Mucositis Oral: Grade 0] : Mucositis Oral: Grade 0  [Xerostomia: Grade 1 - Symptomatic (e.g., dry or thick saliva) without significant dietary alteration; unstimulated saliva flow >0.2 ml/min] : Xerostomia: Grade 1 - Symptomatic (e.g., dry or thick saliva) without significant dietary alteration; unstimulated saliva flow >0.2 ml/min [Oral Pain: Grade 0] : Oral Pain: Grade 0 [Dysgeusia: Grade 2 - Altered taste with change in diet (e.g., oral supplements); noxious or unpleasant taste; loss of taste] : Dysgeusia: Grade 2 - Altered taste with change in diet (e.g., oral supplements); noxious or unpleasant taste; loss of taste [Cough: Grade 0] : Cough: Grade 0 [Hoarseness: Grade 0] : Hoarseness: Grade 0 [Skin Hyperpigmentation: Grade 1 - Hyperpigmentation covering <10% BSA; no psychosocial impact] : Skin Hyperpigmentation: Grade 1 - Hyperpigmentation covering <10% BSA; no psychosocial impact [Dermatitis Radiation: Grade 1 - Faint erythema or dry desquamation] : Dermatitis Radiation: Grade 1 - Faint erythema or dry desquamation [Nausea: Grade 1 - Loss of appetite without alteration in eating habits] : Nausea: Grade 1 - Loss of appetite without alteration in eating habits [FreeTextEntry6] : PEG

## 2021-06-27 NOTE — HISTORY OF PRESENT ILLNESS
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 \par Lymph node, right level 2 biopsy positive for squamous cell carcinoma on March 2 2021; reportedly HPV/P16 negative. \par \par Presents today for OTV. Completed 31/35 fx. C/o throat soreness, using magic mixture as prescribed with some relief. Using PEG. 5 lb weight loss due to nausea and vomiting.

## 2021-07-06 ENCOUNTER — APPOINTMENT (OUTPATIENT)
Dept: OTOLARYNGOLOGY | Facility: CLINIC | Age: 56
End: 2021-07-06

## 2021-07-11 ENCOUNTER — OUTPATIENT (OUTPATIENT)
Dept: OUTPATIENT SERVICES | Facility: HOSPITAL | Age: 56
LOS: 1 days | Discharge: HOME | End: 2021-07-11

## 2021-07-11 DIAGNOSIS — Z98.89 OTHER SPECIFIED POSTPROCEDURAL STATES: Chronic | ICD-10-CM

## 2021-07-11 DIAGNOSIS — F43.23 ADJUSTMENT DISORDER WITH MIXED ANXIETY AND DEPRESSED MOOD: ICD-10-CM

## 2021-07-13 ENCOUNTER — APPOINTMENT (OUTPATIENT)
Dept: HEMATOLOGY ONCOLOGY | Facility: CLINIC | Age: 56
End: 2021-07-13
Payer: MEDICAID

## 2021-07-13 VITALS
SYSTOLIC BLOOD PRESSURE: 113 MMHG | HEIGHT: 71.97 IN | OXYGEN SATURATION: 98 % | TEMPERATURE: 97.8 F | BODY MASS INDEX: 20.25 KG/M2 | HEART RATE: 84 BPM | DIASTOLIC BLOOD PRESSURE: 80 MMHG | WEIGHT: 149.47 LBS | RESPIRATION RATE: 18 BRPM

## 2021-07-13 DIAGNOSIS — C09.9 MALIGNANT NEOPLASM OF TONSIL, UNSPECIFIED: ICD-10-CM

## 2021-07-13 PROCEDURE — 99215 OFFICE O/P EST HI 40 MIN: CPT

## 2021-07-14 ENCOUNTER — NON-APPOINTMENT (OUTPATIENT)
Age: 56
End: 2021-07-14

## 2021-07-14 ENCOUNTER — APPOINTMENT (OUTPATIENT)
Dept: RADIATION ONCOLOGY | Facility: CLINIC | Age: 56
End: 2021-07-14
Payer: MEDICAID

## 2021-07-14 VITALS
HEART RATE: 79 BPM | OXYGEN SATURATION: 98 % | BODY MASS INDEX: 20.04 KG/M2 | DIASTOLIC BLOOD PRESSURE: 84 MMHG | SYSTOLIC BLOOD PRESSURE: 126 MMHG | WEIGHT: 147.6 LBS | RESPIRATION RATE: 17 BRPM | TEMPERATURE: 97.6 F

## 2021-07-14 PROCEDURE — 99024 POSTOP FOLLOW-UP VISIT: CPT

## 2021-07-15 NOTE — HISTORY OF PRESENT ILLNESS
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 \par Lymph node, right level 2 biopsy positive for squamous cell carcinoma on March 2 2021; reportedly HPV/P16 negative. \par Completed RT to oropharynx/neck 6/4/2021 total dose 70 Gy. \par \par Presents today for post treatment evaluation.

## 2021-07-15 NOTE — PHYSICAL EXAM
[FreeTextEntry1] : H&n; oral cavity, no mass, fair amount of saliva, mobile tongue. no cervical lymphadenopathy

## 2021-07-16 PROBLEM — C09.9 SQUAMOUS CELL CARCINOMA OF RIGHT TONSIL: Status: ACTIVE | Noted: 2021-04-01

## 2021-07-16 NOTE — REVIEW OF SYSTEMS
[Loss of Hearing] : loss of hearing [Hoarseness] : hoarseness [Odynophagia] : odynophagia [Mucosal Pain] : mucosal pain [Negative] : Allergic/Immunologic

## 2021-07-16 NOTE — PHYSICAL EXAM
[Restricted in physically strenuous activity but ambulatory and able to carry out work of a light or sedentary nature] : Status 1- Restricted in physically strenuous activity but ambulatory and able to carry out work of a light or sedentary nature, e.g., light house work, office work [Normal] : affect appropriate [de-identified] : right sided neck mass is not appreciable anymore.
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
## 2021-07-16 NOTE — HISTORY OF PRESENT ILLNESS
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 \par S/p tonsillectomy 03/09/2021, path negative for malignancy.\par Reports weight loss of 22 lb since tonsillectomy. States loss of taste after surgery. \par \par PET/CT 3/9/2021 - Hypermetabolic right neck lesion likely secondary to enlarged hypermetabolic LNs. There is slight asymmetry of the tonsils with slight increased activity on the right versus the left. No hypermetabolic evidence of distant metastatic disease. \par \par Denies dysphagia, odynophagia, dyspnea, dysphonia. Reports right otalgia s/p tonsilectomy. Reports feels like he's underwater. Denies hearing loss.\par He had an audiology exam that was normal.  [de-identified] : Feels better but still needs to recover more. \par He is not eating well, using feeding tube. Will need nutrition follow up. \par Reports constant ringing in ears. will need audiology follow up. \par Smokes marijuana a few times a day, discussed medical marijuana, will need to see pal care.

## 2021-08-12 ENCOUNTER — OUTPATIENT (OUTPATIENT)
Dept: OUTPATIENT SERVICES | Facility: HOSPITAL | Age: 56
LOS: 1 days | Discharge: HOME | End: 2021-08-12

## 2021-08-12 DIAGNOSIS — Z98.89 OTHER SPECIFIED POSTPROCEDURAL STATES: Chronic | ICD-10-CM

## 2021-08-12 DIAGNOSIS — C09.9 MALIGNANT NEOPLASM OF TONSIL, UNSPECIFIED: ICD-10-CM

## 2021-08-13 ENCOUNTER — APPOINTMENT (OUTPATIENT)
Dept: HEMATOLOGY ONCOLOGY | Facility: CLINIC | Age: 56
End: 2021-08-13

## 2021-09-01 ENCOUNTER — OUTPATIENT (OUTPATIENT)
Dept: OUTPATIENT SERVICES | Facility: HOSPITAL | Age: 56
LOS: 1 days | End: 2021-09-01
Payer: MEDICAID

## 2021-09-01 DIAGNOSIS — Z98.89 OTHER SPECIFIED POSTPROCEDURAL STATES: Chronic | ICD-10-CM

## 2021-09-08 RX ORDER — METOCLOPRAMIDE 10 MG/1
10 TABLET ORAL EVERY 6 HOURS
Qty: 60 | Refills: 3 | Status: ACTIVE | COMMUNITY
Start: 2021-04-16 | End: 1900-01-01

## 2021-09-13 ENCOUNTER — OUTPATIENT (OUTPATIENT)
Dept: OUTPATIENT SERVICES | Facility: HOSPITAL | Age: 56
LOS: 1 days | End: 2021-09-13
Payer: MEDICAID

## 2021-09-13 ENCOUNTER — APPOINTMENT (OUTPATIENT)
Dept: NUCLEAR MEDICINE | Facility: IMAGING CENTER | Age: 56
End: 2021-09-13
Payer: MEDICAID

## 2021-09-13 DIAGNOSIS — C09.9 MALIGNANT NEOPLASM OF TONSIL, UNSPECIFIED: ICD-10-CM

## 2021-09-13 DIAGNOSIS — Z98.89 OTHER SPECIFIED POSTPROCEDURAL STATES: Chronic | ICD-10-CM

## 2021-09-13 PROCEDURE — 78815 PET IMAGE W/CT SKULL-THIGH: CPT | Mod: 26,PS

## 2021-09-13 PROCEDURE — 78815 PET IMAGE W/CT SKULL-THIGH: CPT

## 2021-09-13 PROCEDURE — A9552: CPT

## 2021-09-13 RX ORDER — FLUCONAZOLE 40 MG/ML
40 POWDER, FOR SUSPENSION ORAL
Qty: 1 | Refills: 0 | Status: ACTIVE | COMMUNITY
Start: 2021-05-27 | End: 1900-01-01

## 2021-09-16 ENCOUNTER — INPATIENT (INPATIENT)
Facility: HOSPITAL | Age: 56
LOS: 5 days | Discharge: HOME | End: 2021-09-22
Attending: STUDENT IN AN ORGANIZED HEALTH CARE EDUCATION/TRAINING PROGRAM | Admitting: STUDENT IN AN ORGANIZED HEALTH CARE EDUCATION/TRAINING PROGRAM
Payer: MEDICAID

## 2021-09-16 VITALS
SYSTOLIC BLOOD PRESSURE: 113 MMHG | DIASTOLIC BLOOD PRESSURE: 73 MMHG | WEIGHT: 125 LBS | HEIGHT: 73 IN | OXYGEN SATURATION: 98 % | RESPIRATION RATE: 16 BRPM | TEMPERATURE: 98 F | HEART RATE: 81 BPM

## 2021-09-16 DIAGNOSIS — Z98.89 OTHER SPECIFIED POSTPROCEDURAL STATES: Chronic | ICD-10-CM

## 2021-09-16 PROCEDURE — 99285 EMERGENCY DEPT VISIT HI MDM: CPT | Mod: 25

## 2021-09-16 PROCEDURE — 93010 ELECTROCARDIOGRAM REPORT: CPT

## 2021-09-16 NOTE — ED ADULT TRIAGE NOTE - CHIEF COMPLAINT QUOTE
presents for weakness and poor appetite. reports unintentional weight loss of 58Lbs since march. PMH head and neck CA. (unknown of last chemo or radiation treatment). no distress noted.

## 2021-09-17 DIAGNOSIS — R62.7 ADULT FAILURE TO THRIVE: ICD-10-CM

## 2021-09-17 DIAGNOSIS — F32.9 MAJOR DEPRESSIVE DISORDER, SINGLE EPISODE, UNSPECIFIED: ICD-10-CM

## 2021-09-17 DIAGNOSIS — R07.9 CHEST PAIN, UNSPECIFIED: ICD-10-CM

## 2021-09-17 DIAGNOSIS — Z98.890 OTHER SPECIFIED POSTPROCEDURAL STATES: Chronic | ICD-10-CM

## 2021-09-17 DIAGNOSIS — Z29.9 ENCOUNTER FOR PROPHYLACTIC MEASURES, UNSPECIFIED: ICD-10-CM

## 2021-09-17 LAB
ALBUMIN SERPL ELPH-MCNC: 4.4 G/DL — SIGNIFICANT CHANGE UP (ref 3.3–5)
ALP SERPL-CCNC: 78 U/L — SIGNIFICANT CHANGE UP (ref 40–120)
ALT FLD-CCNC: 27 U/L — SIGNIFICANT CHANGE UP (ref 4–41)
ANION GAP SERPL CALC-SCNC: 16 MMOL/L — HIGH (ref 7–14)
ANION GAP SERPL CALC-SCNC: 9 MMOL/L — SIGNIFICANT CHANGE UP (ref 7–14)
APPEARANCE UR: CLEAR — SIGNIFICANT CHANGE UP
AST SERPL-CCNC: 48 U/L — HIGH (ref 4–40)
B PERT DNA SPEC QL NAA+PROBE: SIGNIFICANT CHANGE UP
B PERT+PARAPERT DNA PNL SPEC NAA+PROBE: SIGNIFICANT CHANGE UP
BASOPHILS # BLD AUTO: 0.03 K/UL — SIGNIFICANT CHANGE UP (ref 0–0.2)
BASOPHILS NFR BLD AUTO: 1 % — SIGNIFICANT CHANGE UP (ref 0–2)
BILIRUB SERPL-MCNC: <0.2 MG/DL — SIGNIFICANT CHANGE UP (ref 0.2–1.2)
BILIRUB UR-MCNC: NEGATIVE — SIGNIFICANT CHANGE UP
BLOOD GAS VENOUS COMPREHENSIVE RESULT: SIGNIFICANT CHANGE UP
BORDETELLA PARAPERTUSSIS (RAPRVP): SIGNIFICANT CHANGE UP
BUN SERPL-MCNC: 16 MG/DL — SIGNIFICANT CHANGE UP (ref 7–23)
BUN SERPL-MCNC: 18 MG/DL — SIGNIFICANT CHANGE UP (ref 7–23)
C PNEUM DNA SPEC QL NAA+PROBE: SIGNIFICANT CHANGE UP
CALCIUM SERPL-MCNC: 8.9 MG/DL — SIGNIFICANT CHANGE UP (ref 8.4–10.5)
CALCIUM SERPL-MCNC: 9.6 MG/DL — SIGNIFICANT CHANGE UP (ref 8.4–10.5)
CHLORIDE SERPL-SCNC: 102 MMOL/L — SIGNIFICANT CHANGE UP (ref 98–107)
CHLORIDE SERPL-SCNC: 104 MMOL/L — SIGNIFICANT CHANGE UP (ref 98–107)
CO2 SERPL-SCNC: 19 MMOL/L — LOW (ref 22–31)
CO2 SERPL-SCNC: 28 MMOL/L — SIGNIFICANT CHANGE UP (ref 22–31)
COLOR SPEC: COLORLESS — SIGNIFICANT CHANGE UP
CREAT SERPL-MCNC: 1.12 MG/DL — SIGNIFICANT CHANGE UP (ref 0.5–1.3)
CREAT SERPL-MCNC: 1.15 MG/DL — SIGNIFICANT CHANGE UP (ref 0.5–1.3)
DIFF PNL FLD: NEGATIVE — SIGNIFICANT CHANGE UP
EOSINOPHIL # BLD AUTO: 0.17 K/UL — SIGNIFICANT CHANGE UP (ref 0–0.5)
EOSINOPHIL NFR BLD AUTO: 5.9 % — SIGNIFICANT CHANGE UP (ref 0–6)
FLUAV SUBTYP SPEC NAA+PROBE: SIGNIFICANT CHANGE UP
FLUBV RNA SPEC QL NAA+PROBE: SIGNIFICANT CHANGE UP
GLUCOSE SERPL-MCNC: 78 MG/DL — SIGNIFICANT CHANGE UP (ref 70–99)
GLUCOSE SERPL-MCNC: 84 MG/DL — SIGNIFICANT CHANGE UP (ref 70–99)
GLUCOSE UR QL: NEGATIVE — SIGNIFICANT CHANGE UP
HADV DNA SPEC QL NAA+PROBE: SIGNIFICANT CHANGE UP
HCOV 229E RNA SPEC QL NAA+PROBE: SIGNIFICANT CHANGE UP
HCOV HKU1 RNA SPEC QL NAA+PROBE: SIGNIFICANT CHANGE UP
HCOV NL63 RNA SPEC QL NAA+PROBE: SIGNIFICANT CHANGE UP
HCOV OC43 RNA SPEC QL NAA+PROBE: SIGNIFICANT CHANGE UP
HCT VFR BLD CALC: 37.2 % — LOW (ref 39–50)
HGB BLD-MCNC: 12.4 G/DL — LOW (ref 13–17)
HMPV RNA SPEC QL NAA+PROBE: SIGNIFICANT CHANGE UP
HPIV1 RNA SPEC QL NAA+PROBE: SIGNIFICANT CHANGE UP
HPIV2 RNA SPEC QL NAA+PROBE: SIGNIFICANT CHANGE UP
HPIV3 RNA SPEC QL NAA+PROBE: SIGNIFICANT CHANGE UP
HPIV4 RNA SPEC QL NAA+PROBE: SIGNIFICANT CHANGE UP
IANC: 1.64 K/UL — SIGNIFICANT CHANGE UP (ref 1.5–8.5)
IMM GRANULOCYTES NFR BLD AUTO: 0.3 % — SIGNIFICANT CHANGE UP (ref 0–1.5)
KETONES UR-MCNC: NEGATIVE — SIGNIFICANT CHANGE UP
LEUKOCYTE ESTERASE UR-ACNC: NEGATIVE — SIGNIFICANT CHANGE UP
LYMPHOCYTES # BLD AUTO: 0.57 K/UL — LOW (ref 1–3.3)
LYMPHOCYTES # BLD AUTO: 19.7 % — SIGNIFICANT CHANGE UP (ref 13–44)
M PNEUMO DNA SPEC QL NAA+PROBE: SIGNIFICANT CHANGE UP
MCHC RBC-ENTMCNC: 31.4 PG — SIGNIFICANT CHANGE UP (ref 27–34)
MCHC RBC-ENTMCNC: 33.3 GM/DL — SIGNIFICANT CHANGE UP (ref 32–36)
MCV RBC AUTO: 94.2 FL — SIGNIFICANT CHANGE UP (ref 80–100)
MONOCYTES # BLD AUTO: 0.48 K/UL — SIGNIFICANT CHANGE UP (ref 0–0.9)
MONOCYTES NFR BLD AUTO: 16.6 % — HIGH (ref 2–14)
NEUTROPHILS # BLD AUTO: 1.64 K/UL — LOW (ref 1.8–7.4)
NEUTROPHILS NFR BLD AUTO: 56.5 % — SIGNIFICANT CHANGE UP (ref 43–77)
NITRITE UR-MCNC: NEGATIVE — SIGNIFICANT CHANGE UP
NRBC # BLD: 0 /100 WBCS — SIGNIFICANT CHANGE UP
NRBC # FLD: 0 K/UL — SIGNIFICANT CHANGE UP
PH UR: 7 — SIGNIFICANT CHANGE UP (ref 5–8)
PLATELET # BLD AUTO: 179 K/UL — SIGNIFICANT CHANGE UP (ref 150–400)
POTASSIUM SERPL-MCNC: 3.9 MMOL/L — SIGNIFICANT CHANGE UP (ref 3.5–5.3)
POTASSIUM SERPL-MCNC: 6 MMOL/L — HIGH (ref 3.5–5.3)
POTASSIUM SERPL-SCNC: 3.9 MMOL/L — SIGNIFICANT CHANGE UP (ref 3.5–5.3)
POTASSIUM SERPL-SCNC: 6 MMOL/L — HIGH (ref 3.5–5.3)
PROT SERPL-MCNC: 7.1 G/DL — SIGNIFICANT CHANGE UP (ref 6–8.3)
PROT UR-MCNC: NEGATIVE — SIGNIFICANT CHANGE UP
RAPID RVP RESULT: SIGNIFICANT CHANGE UP
RBC # BLD: 3.95 M/UL — LOW (ref 4.2–5.8)
RBC # FLD: 13 % — SIGNIFICANT CHANGE UP (ref 10.3–14.5)
RSV RNA SPEC QL NAA+PROBE: SIGNIFICANT CHANGE UP
RV+EV RNA SPEC QL NAA+PROBE: SIGNIFICANT CHANGE UP
SARS-COV-2 RNA SPEC QL NAA+PROBE: SIGNIFICANT CHANGE UP
SARS-COV-2 RNA SPEC QL NAA+PROBE: SIGNIFICANT CHANGE UP
SODIUM SERPL-SCNC: 137 MMOL/L — SIGNIFICANT CHANGE UP (ref 135–145)
SODIUM SERPL-SCNC: 141 MMOL/L — SIGNIFICANT CHANGE UP (ref 135–145)
SP GR SPEC: 1.01 — SIGNIFICANT CHANGE UP (ref 1–1.05)
UROBILINOGEN FLD QL: SIGNIFICANT CHANGE UP
WBC # BLD: 2.9 K/UL — LOW (ref 3.8–10.5)
WBC # FLD AUTO: 2.9 K/UL — LOW (ref 3.8–10.5)

## 2021-09-17 PROCEDURE — 76937 US GUIDE VASCULAR ACCESS: CPT | Mod: 26

## 2021-09-17 PROCEDURE — 90792 PSYCH DIAG EVAL W/MED SRVCS: CPT

## 2021-09-17 PROCEDURE — 71045 X-RAY EXAM CHEST 1 VIEW: CPT | Mod: 26

## 2021-09-17 PROCEDURE — 99223 1ST HOSP IP/OBS HIGH 75: CPT

## 2021-09-17 RX ORDER — SODIUM CHLORIDE 9 MG/ML
1000 INJECTION, SOLUTION INTRAVENOUS ONCE
Refills: 0 | Status: COMPLETED | OUTPATIENT
Start: 2021-09-17 | End: 2021-09-17

## 2021-09-17 RX ORDER — HALOPERIDOL DECANOATE 100 MG/ML
5 INJECTION INTRAMUSCULAR EVERY 6 HOURS
Refills: 0 | Status: DISCONTINUED | OUTPATIENT
Start: 2021-09-17 | End: 2021-09-17

## 2021-09-17 RX ORDER — METOCLOPRAMIDE HCL 10 MG
10 TABLET ORAL EVERY 6 HOURS
Refills: 0 | Status: DISCONTINUED | OUTPATIENT
Start: 2021-09-17 | End: 2021-09-22

## 2021-09-17 RX ORDER — SODIUM CHLORIDE 9 MG/ML
1000 INJECTION INTRAMUSCULAR; INTRAVENOUS; SUBCUTANEOUS
Refills: 0 | Status: DISCONTINUED | OUTPATIENT
Start: 2021-09-17 | End: 2021-09-19

## 2021-09-17 RX ORDER — MIRTAZAPINE 45 MG/1
15 TABLET, ORALLY DISINTEGRATING ORAL AT BEDTIME
Refills: 0 | Status: DISCONTINUED | OUTPATIENT
Start: 2021-09-17 | End: 2021-09-22

## 2021-09-17 RX ORDER — METOCLOPRAMIDE HCL 10 MG
10 TABLET ORAL EVERY 6 HOURS
Refills: 0 | Status: DISCONTINUED | OUTPATIENT
Start: 2021-09-17 | End: 2021-09-17

## 2021-09-17 RX ORDER — HEPARIN SODIUM 5000 [USP'U]/ML
5000 INJECTION INTRAVENOUS; SUBCUTANEOUS EVERY 8 HOURS
Refills: 0 | Status: DISCONTINUED | OUTPATIENT
Start: 2021-09-17 | End: 2021-09-21

## 2021-09-17 RX ORDER — FLUCONAZOLE 150 MG/1
200 TABLET ORAL DAILY
Refills: 0 | Status: DISCONTINUED | OUTPATIENT
Start: 2021-09-17 | End: 2021-09-17

## 2021-09-17 RX ORDER — ACETAMINOPHEN 500 MG
650 TABLET ORAL EVERY 6 HOURS
Refills: 0 | Status: DISCONTINUED | OUTPATIENT
Start: 2021-09-17 | End: 2021-09-22

## 2021-09-17 RX ORDER — ACETAMINOPHEN 500 MG
1000 TABLET ORAL ONCE
Refills: 0 | Status: COMPLETED | OUTPATIENT
Start: 2021-09-17 | End: 2021-09-17

## 2021-09-17 RX ORDER — FLUCONAZOLE 150 MG/1
100 TABLET ORAL DAILY
Refills: 0 | Status: DISCONTINUED | OUTPATIENT
Start: 2021-09-17 | End: 2021-09-22

## 2021-09-17 RX ORDER — LANOLIN ALCOHOL/MO/W.PET/CERES
3 CREAM (GRAM) TOPICAL AT BEDTIME
Refills: 0 | Status: DISCONTINUED | OUTPATIENT
Start: 2021-09-17 | End: 2021-09-22

## 2021-09-17 RX ORDER — QUETIAPINE FUMARATE 200 MG/1
25 TABLET, FILM COATED ORAL EVERY 6 HOURS
Refills: 0 | Status: DISCONTINUED | OUTPATIENT
Start: 2021-09-17 | End: 2021-09-22

## 2021-09-17 RX ORDER — HALOPERIDOL DECANOATE 100 MG/ML
5 INJECTION INTRAMUSCULAR EVERY 6 HOURS
Refills: 0 | Status: DISCONTINUED | OUTPATIENT
Start: 2021-09-17 | End: 2021-09-18

## 2021-09-17 RX ORDER — INFLUENZA VIRUS VACCINE 15; 15; 15; 15 UG/.5ML; UG/.5ML; UG/.5ML; UG/.5ML
0.5 SUSPENSION INTRAMUSCULAR ONCE
Refills: 0 | Status: DISCONTINUED | OUTPATIENT
Start: 2021-09-17 | End: 2021-09-22

## 2021-09-17 RX ORDER — ONDANSETRON 8 MG/1
4 TABLET, FILM COATED ORAL EVERY 8 HOURS
Refills: 0 | Status: DISCONTINUED | OUTPATIENT
Start: 2021-09-17 | End: 2021-09-22

## 2021-09-17 RX ORDER — FLUCONAZOLE 150 MG/1
5 TABLET ORAL
Qty: 0 | Refills: 0 | DISCHARGE

## 2021-09-17 RX ADMIN — SODIUM CHLORIDE 75 MILLILITER(S): 9 INJECTION INTRAMUSCULAR; INTRAVENOUS; SUBCUTANEOUS at 13:06

## 2021-09-17 RX ADMIN — Medication 1000 MILLIGRAM(S): at 03:14

## 2021-09-17 RX ADMIN — Medication 10 MILLIGRAM(S): at 11:34

## 2021-09-17 RX ADMIN — SODIUM CHLORIDE 75 MILLILITER(S): 9 INJECTION INTRAMUSCULAR; INTRAVENOUS; SUBCUTANEOUS at 10:23

## 2021-09-17 RX ADMIN — HEPARIN SODIUM 5000 UNIT(S): 5000 INJECTION INTRAVENOUS; SUBCUTANEOUS at 22:40

## 2021-09-17 RX ADMIN — Medication 400 MILLIGRAM(S): at 02:59

## 2021-09-17 RX ADMIN — HEPARIN SODIUM 5000 UNIT(S): 5000 INJECTION INTRAVENOUS; SUBCUTANEOUS at 13:18

## 2021-09-17 RX ADMIN — MIRTAZAPINE 15 MILLIGRAM(S): 45 TABLET, ORALLY DISINTEGRATING ORAL at 22:40

## 2021-09-17 RX ADMIN — Medication 2 MILLIGRAM(S): at 16:17

## 2021-09-17 RX ADMIN — SODIUM CHLORIDE 1000 MILLILITER(S): 9 INJECTION, SOLUTION INTRAVENOUS at 03:18

## 2021-09-17 RX ADMIN — SODIUM CHLORIDE 75 MILLILITER(S): 9 INJECTION INTRAMUSCULAR; INTRAVENOUS; SUBCUTANEOUS at 09:54

## 2021-09-17 NOTE — H&P ADULT - NEGATIVE MUSCULOSKELETAL SYMPTOMS
no arthritis/no joint swelling/no muscle weakness/no stiffness/no arm pain L/no arm pain R/no back pain/no leg pain L/no leg pain R

## 2021-09-17 NOTE — PATIENT PROFILE ADULT - NSTRANSFERBELONGINGSDISPO_GEN_A_NUR
.  Dianne Humphreys   ascom-- [phone] [facility]   [phone] [facility] ED   given to family
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
## 2021-09-17 NOTE — SWALLOW BEDSIDE ASSESSMENT ADULT - SWALLOW EVAL: DIAGNOSIS
1. Patient demonstrated a functional oral management for puree, solids, honey thick, nectar thick and thin liquid textures marked by adequate bolus collection, transfer and posterior transport. Patient demonstrated piecemeal transport during PO trials of solids. 2. Patient demonstrated a functional pharyngeal phase of swallow for puree, solids, honey thick, nectar thick, and thin liquid textures marked by a present pharyngeal swallow trigger with hyolaryngeal elevation noted upon digital palpation without evidence of airway penetration/aspiration. Of note, patient reporting globus sensation during PO trials of puree and solids that reduced when utilizing a liquid wash.

## 2021-09-17 NOTE — SWALLOW BEDSIDE ASSESSMENT ADULT - SWALLOW EVAL: RECOMMENDED DIET
1) Regular solids with thin liquids, as tolerated 2) Utilize J tube to help meet nutrition/hydration needs. Patient is requesting refill :     fluoxetine (PROZAC) 40 MG capsule    [facility] [place] - patient has been out for 3 days. Can we fill today for him?    1) Continue non-oral means of nutrition via J tube feeding to help meet nutrition/hydration needs. 2) Regular solids with thin liquids, as tolerated

## 2021-09-17 NOTE — BH CONSULTATION LIAISON ASSESSMENT NOTE - NSSUICPROTFACT_PSY_ALL_CORE
Responsibility to children, family, or others/Identifies reasons for living/Supportive social network of family or friends/Engaged in work or school/Faith beliefs

## 2021-09-17 NOTE — ED ADULT NURSE REASSESSMENT NOTE - NS ED NURSE REASSESS COMMENT FT1
psych resident,attending at bedside to evaluate for pt stating " I don't feel like living,i just want to get it over with,i have so many bills and I do not feel hungry" ptwill continue to monitor
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
## 2021-09-17 NOTE — ED PROVIDER NOTE - CLINICAL SUMMARY MEDICAL DECISION MAKING FREE TEXT BOX
DO Hayde PGY-3: 55 y/o M presenting with suspected failure to thrive. Will also eval for metabolic etiology vs anemia. Patient difficulty swallowing may be 2/2 to radiation therapy for ca. Will likely be admitted for FTT and further eval of patients dysphagia

## 2021-09-17 NOTE — BH CONSULTATION LIAISON ASSESSMENT NOTE - NSBHCHARTREVIEWLAB_PSY_A_CORE FT
CBC Full  -  ( 17 Sep 2021 02:08 )  WBC Count : 2.90 K/uL  RBC Count : 3.95 M/uL  Hemoglobin : 12.4 g/dL  Hematocrit : 37.2 %  Platelet Count - Automated : 179 K/uL  Mean Cell Volume : 94.2 fL  Mean Cell Hemoglobin : 31.4 pg  Mean Cell Hemoglobin Concentration : 33.3 gm/dL  Auto Neutrophil # : 1.64 K/uL  Auto Lymphocyte # : 0.57 K/uL  Auto Monocyte # : 0.48 K/uL  Auto Eosinophil # : 0.17 K/uL  Auto Basophil # : 0.03 K/uL  Auto Neutrophil % : 56.5 %  Auto Lymphocyte % : 19.7 %  Auto Monocyte % : 16.6 %  Auto Eosinophil % : 5.9 %  Auto Basophil % : 1.0 %  09-17    141  |  104  |  16  ----------------------------<  84  3.9   |  28  |  1.12    Ca    8.9      17 Sep 2021 04:27  Phos  3.8     09-17  Mg     2.20     09-17    TPro  7.1  /  Alb  4.4  /  TBili  <0.2  /  DBili  x   /  AST  48<H>  /  ALT  27  /  AlkPhos  78  09-17

## 2021-09-17 NOTE — ED ADULT NURSE NOTE - OBJECTIVE STATEMENT
received pt to  12, A7Ox4, amb. 55y/o male hx of squamous cell carcinoma complaining of worsening weakness and weight loss over last 6 months. pt states he lost 57 lbs since march and has not been eating much. no other acute complaints. denies fever/chills, dizziness, palpitations, chest pain, abdominal pain. VSS. pt awaiting US IV at this time.

## 2021-09-17 NOTE — BH CONSULTATION LIAISON ASSESSMENT NOTE - RISK ASSESSMENT
Risk Factors: acute medical condition with physical complaints, depression, poor sleep, anhedonia, endorsing suicidal/homicidal ideation  Protective Factors: residential stability, supportive family, no hx psychiatric treatment, no hx ah/vh, willing to accept treatment

## 2021-09-17 NOTE — ED PROVIDER NOTE - OBJECTIVE STATEMENT
55 y/o M with PMH of head/neck ca s/p chemo and radiation in remission presenting with generalized weakness. Patient states he does not have energy to do anything, feels that he can barely walk around due to his weakness. Also complaining of difficulty swallowing food, states he feels food getting stuck in his throat. Is able to drink liquids and swallow pills. States he has PEG tube which he uses for feeds and shakes. Patient states despite using PEG for feeds, continues to lose weight. Appx >50lbs over last 6 months

## 2021-09-17 NOTE — ED ADULT NURSE REASSESSMENT NOTE - NS ED NURSE REASSESS COMMENT FT1
pt transferred with 1:1 in place, asleep in stretcher. respirations even and unlabored. hob elevated. nad noted/ report endorsed to 8N by jazmin huston. pt transferred with 1:1 in place, asleep in stretcher. respirations even and unlabored. hob elevated. nad noted/

## 2021-09-17 NOTE — H&P ADULT - ASSESSMENT
55 y/o M with PMH of Squamos Cell Carcinoma of right tonsills s/p tonsillectemy 3/2021,  chemo and radiation 3 months ago in remission presenting with generalized weakness/ FTT. Patient states he does not have energy to do anything, feels that he can barely walk around due to his weakness. 57 y/o M with PMH of Squamos Cell Carcinoma of right tonsills s/p tonsillectemy 3/2021,  chemo and radiation 3 months ago in remission presenting with generalized weakness/ FTT.
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
## 2021-09-17 NOTE — ED PROVIDER NOTE - NS ED ROS FT
CONSTITUTIONAL: No fevers, no chills, no lightheadedness, no dizziness  Eyes: no visual changes  Ears: no ear drainage, no ear pain  Nose: no nasal congestion  Mouth/Throat: no sore throat  CV: No chest pain, no palpitations  PULM: No SOB, no cough  GI: No n/v/d, no abd pain  : no dysuria, no hematuria  SKIN: no rashes  NEURO: no headache, no focal weakness or numbness  LYMPH/VASC: no LE swelling

## 2021-09-17 NOTE — BH CONSULTATION LIAISON ASSESSMENT NOTE - CURRENT MEDICATION
MEDICATIONS  (STANDING):  fluconAZOLE   Tablet 100 milliGRAM(s) Oral daily  heparin   Injectable 5000 Unit(s) SubCutaneous every 8 hours  mirtazapine 15 milliGRAM(s) Oral at bedtime  sodium chloride 0.9%. 1000 milliLiter(s) (75 mL/Hr) IV Continuous <Continuous>    MEDICATIONS  (PRN):  acetaminophen   Tablet .. 650 milliGRAM(s) Oral every 6 hours PRN Temp greater or equal to 38.5C (101.3F), Mild Pain (1 - 3)  aluminum hydroxide/magnesium hydroxide/simethicone Suspension 30 milliLiter(s) Oral every 4 hours PRN Dyspepsia  haloperidol    Injectable 5 milliGRAM(s) IV Push every 6 hours PRN severe anxiety/agitation  LORazepam   Injectable 2 milliGRAM(s) IV Push three times a day PRN severe anxiety/agitation  melatonin 3 milliGRAM(s) Oral at bedtime PRN Insomnia  metoclopramide 10 milliGRAM(s) Oral every 6 hours PRN gastroparesis  ondansetron Injectable 4 milliGRAM(s) IV Push every 8 hours PRN Nausea and/or Vomiting  QUEtiapine 25 milliGRAM(s) Oral every 6 hours PRN anxiety/agitation

## 2021-09-17 NOTE — SWALLOW BEDSIDE ASSESSMENT ADULT - ASR SWALLOW REFERRAL
Consider ENT given patient's history of head and neck cancer, as well as, patient reporting pain in neck/behind ear

## 2021-09-17 NOTE — ED ADULT NURSE REASSESSMENT NOTE - NS ED NURSE REASSESS COMMENT FT1
pt alert,oriented x3. denies c/o pain. reports continued weakness. turned,positioned, pt noted peg tube. awaits med bed. will continue to monitor

## 2021-09-17 NOTE — BH CONSULTATION LIAISON ASSESSMENT NOTE - NSBHCONSULTPRIMARYDISCUSSYES_PSY_A_CORE FT
discussed above with Mazin discussed above with Mazin  Will need to reassess for inpt vs. outpt psych

## 2021-09-17 NOTE — PHYSICAL THERAPY INITIAL EVALUATION ADULT - ADDITIONAL COMMENTS
Patient lives in apartment with wife and children, patient was independent in all ADL prior to admission. Patient was not using assistive device prior to admission.
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
## 2021-09-17 NOTE — H&P ADULT - HISTORY OF PRESENT ILLNESS
55 y/o M with PMH of Squamos Cell Carcinoma of right tonsills s/p tonsillectemy 3/2021,  chemo and radiation 3 months ago in remission presenting with generalized weakness/ FTT. Patient states he does not have energy to do anything, feels that he can barely walk around due to his weakness. + Difficulty swallowing food; and despite using PEG for feeds ( real food blend+ boost shakes), he continues to lose weight. Patients wife states Approximately >50lbs over last 6 months. + cough with clear sputum for 3 months. NO  SOB at rest, LEONARD, PND, orthopnea, palpitations, diaphoresis, lightheadedness, dizziness, syncope, increased lower extremity edema, fever chills, malaise, myalgias, anorexia, weight changes ( loss or gain), night sweats, generalized fatigue, abdominal pain, N/V/C/D BRBPR, melena, urinary symptoms,  and wheezing.         h & P IS INCOMPLETE 57 yo male with PMH of Squamous Cell Carcinoma of right tonsils s/p tonsillectemy 3/2021, chemo and radiation 3 months ago in remission presenting with generalized weakness / FTT. Patient states he does not have energy to do anything, feels that he can barely walk around due to his weakness and body aches worsening over the past week. + Difficulty swallowing food, feeling like the food is getting "stuck" in his throat since finishing radiation therapy 6 months ago, patient admits not being able to eat with his family attributes to his depression. Patient c/o associated inability to taste and dry tongue / mouth for the past 4 weeks; and despite using PEG for feeds (real food blend + boost shakes), he continues to lose weight. Patient's wife states approximately >50lbs over last 6 months. + cough with thick, white foamy sputum for 3 months, mildly relieved by gargling. Patient reports intermittent 8/10 sharp chest pain "behind my heart" that radiates to his back for the past 4 weeks, relieved mildly by changing position, worse as the day progresses, when breathing, and walking. Patient states he has 9/10 stabbing right ear pain radiating to his neck that is getting worse since his tonsillectomy 6 months ago, no alleviating factors. + B/L LE edema for the past few months, relieved by rest and elevation of LE. + cold intolerance "all of the time" for the past few months. Patient admits that last night he called his sister in-law to drive him to the hospital because he had a loaded gun in his hand, reports a voice in the back of his head told him to kill himself, but he didn't want to. Patient reports he has had suicidal ideation for the past few weeks with plans to either drive his motorcycle into traffic, shot himself (has guns at home), or take pills with his drink; associated loss of interest in activities he used to enjoy for the past few months, feels like he is "going crazy". NO SOB at rest, LEONARD, PND, orthopnea, palpitations, diaphoresis, lightheadedness, hearing changes, visual changes, dizziness, syncope, fever chills, night sweats, generalized fatigue, abdominal pain, N/V/C/D BRBPR, melena, urinary symptoms, and wheezing.   h & P IS INCOMPLETE 55 yo male with PMH of Squamous Cell Carcinoma of right tonsils s/p tonsillectemy 3/2021, chemo and radiation 3 months ago in remission presenting with generalized weakness / FTT. Patient states he does not have energy to do anything, feels that he can barely walk around due to his weakness and body aches worsening over the past week. + Difficulty swallowing food, feeling like the food is getting "stuck" in his throat since finishing radiation therapy 6 months ago, patient admits not being able to eat with his family attributes to his depression. Patient c/o associated loss of appetite, inability to taste and dry tongue / mouth for the past 4 weeks; and despite using PEG for feeds (real food blend + boost shakes), he continues to lose weight. Patient's wife states approximately >50lbs over last 6 months. + cough with thick, white foamy sputum for 3 months, mildly relieved by gargling. Patient reports intermittent 8/10 sharp chest pain "behind my heart" that radiates to his back for the past 4 weeks, relieved mildly by changing position, worse as the day progresses, when breathing, and walking.  Patient reports he has had suicidal ideation for the past few weeks with plans to either drive his motorcycle into traffic, shot himself (has guns at home), or take pills with his drink; associated loss of interest in activities he used to enjoy for the past few months, feels like he is "going crazy". Pt currently without suicidal ideation and states that he feels better now. . + B/L LE edema for the past few months, relieved by rest and elevation of LE.  NO SOB at rest, LEONARD, PND, orthopnea, palpitations, diaphoresis, lightheadedness, hearing changes, visual changes, dizziness, syncope, fever chills, night sweats, generalized fatigue, abdominal pain, N/V/C/D BRBPR, melena, urinary symptoms, and wheezing.
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
## 2021-09-17 NOTE — SWALLOW BEDSIDE ASSESSMENT ADULT - ADDITIONAL RECOMMENDATIONS
Cinesophagram can be done as an outpatient basis, please provide patient with prescription and fax to [facility] ([phone]) and please have patient schedule appointment with [facility] ([phone]).
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
## 2021-09-17 NOTE — ED ADULT NURSE REASSESSMENT NOTE - NS ED NURSE REASSESS COMMENT FT1
Break Covering RN: Pt resting in stretcher, NAD noted at this time. Respirations even and unlabored, no accessory muscle use. Pt denies cp, sob, abd pain, ha, dizziness, n/v/d at this time.

## 2021-09-17 NOTE — BH CONSULTATION LIAISON ASSESSMENT NOTE - CASE SUMMARY
patient seen with NP Domenic - patient endorses severe depression with notable suicidal thoughts and plan, however unclear how much of this is embellishment given collateral from wife. must keep on 1:1  for now, will continue to reassess. No signs of anthony/psychosis but meets full criterion for MDE. Per wife no firearms at home though patient says there are. Will consider [employer] for patient. May need psych hospitalization.

## 2021-09-17 NOTE — ED ADULT NURSE REASSESSMENT NOTE - NS ED NURSE REASSESS COMMENT FT1
pt remains alert,oriented x3. denies c/o pain at present/  oob to walk with assistance. takes meds without difficulty. placed on npo  by md. will continue to monitor.

## 2021-09-17 NOTE — H&P ADULT - PROBLEM SELECTOR PLAN 2
heparin SQ 5000 TID Ekg: SR @ 54 bpm  r/o ACS, order cardiac enzymes   currently chest pain free  monitor vital signs q4hrs

## 2021-09-17 NOTE — PHYSICAL THERAPY INITIAL EVALUATION ADULT - PERTINENT HX OF CURRENT PROBLEM, REHAB EVAL
57 y/o M with PMH of Squamos Cell Carcinoma of right tonsills s/p tonsillectemy 3/2021,  chemo and radiation 3 months ago in remission presenting with generalized weakness/ FTT.

## 2021-09-17 NOTE — H&P ADULT - NSHPSOCIALHISTORY_GEN_ALL_CORE
Pt is  and lives at home with his wife and children.   Pt is an  and DJ, admits to smoking hx of 20yrs, quit 3 months ago (6/21), admits to vaping since 6/21.  Pt reports he is a social drinker, admits to recreational marijuana use nearly every day for last 30 yrs, denies illicit drug use.

## 2021-09-17 NOTE — SWALLOW BEDSIDE ASSESSMENT ADULT - NS ASR SWALLOW FINDINGS DISCUS
Nursing/Patient Paged ACP/Nursing/Patient Attempted to call ACP Mazin via [employer] [id number]/Nursing/Patient

## 2021-09-17 NOTE — ED PROVIDER NOTE - PHYSICAL EXAMINATION
gen: cachectic  Mentation: AAO x 3  psych: tearful  ENT: airway patent  Eyes: conjunctivae clear bilaterally  Cardio: RRR, no m/r/g  Resp: normal BS b/l  GI: s/nt/nd, +peg tube  : no CVA tenderness  Neuro: sensation and motor function intact  MSK: normal movement of all extremities  Lymph/Vasc: no LE edema

## 2021-09-17 NOTE — SWALLOW BEDSIDE ASSESSMENT ADULT - COMMENTS
Per H&P, patient is a "55 yo male with PMH of Squamous Cell Carcinoma of right tonsils s/p tonsillectemy 3/2021, chemo and radiation 3 months ago in remission presenting with generalized weakness / FTT. Patient states he does not have energy to do anything, feels that he can barely walk around due to his weakness and body aches worsening over the past week. + Difficulty swallowing food, feeling like the food is getting "stuck" in his throat since finishing radiation therapy 6 months ago, patient admits not being able to eat with his family attributes to his depression. Patient c/o associated loss of appetite, inability to taste and dry tongue / mouth for the past 4 weeks; and despite using PEG for feeds (real food blend + boost shakes), he continues to lose weight."    WBC is low. Most recent CXR revealed "clear lungs."    Consult received and chart reviewed. Patient seen at bedside in the [facility] this afternoon for an initial assessment of the swallow, at which time patient was alert and cooperative. Patient is known to this department as he was seen as an outpatient at [facility] on 4/5/21 for a clinical swallow assessment, at which time regular solids with thin liquids was recommended, per [employer] documentation. Patient reporting difficulty swallowing solids, reporting "it gets stuck" and points to throat. Pt states that utilizing a liquid wash helps reduce globus sensation. Patient further reports his mouth feeling very dry and not being able to tastes food/liquids. Patient denies pain with swallowing pre/post today's assessment, however reporting pain on side of neck/behind ear.
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
## 2021-09-17 NOTE — PHARMACOTHERAPY INTERVENTION NOTE - COMMENTS
Medication history is complete and was verified with patient's spouse and [facility]. Medication list updated in Outpatient Medication Record (OMR). Please call spectra [phone] if you have any questions.

## 2021-09-17 NOTE — SWALLOW BEDSIDE ASSESSMENT ADULT - ASR SWALLOW RECOMMEND DIAG
Cinesophagram to objectively assess oral/pharyngeal swallow given pt's history of head and neck cancer, as well as, pt reporting globus sensation after PO trials of solids./VFSS/MBS Cinesophagram to objectively assess oral/pharyngeal swallow given pt's history of head and neck cancer, as well as, pt reporting globus sensation after PO trials of solids. Cinesophagram can be done as outpatient/VFSS/MBS

## 2021-09-17 NOTE — H&P ADULT - PROBLEM SELECTOR PLAN 1
Failure to thrive in setting of Squamous cell CA of right tonsil status post tonsillectomy, chemotherapy, and radiation therapy   CXR: MARY  Ekg: SR @ 54 bpm  UA neg: COVID neg   Nutrition consult  Speech and swallow-P  IVF ; Onc will follow   Continue fluconazole for oral thrush  Continue reglan  Will discuss with hospitalist Failure to thrive in setting of Squamous cell CA of right tonsil status post tonsillectomy, chemotherapy, and radiation therapy   CXR: MARY  Ekg: SR @ 54 bpm  UA neg: COVID neg   WBC: 2.90, repeat CBC  RVP ordered   Nutrition consult  Keep pt NPO for now, Speech and swallow-P  IVF ; Onc will follow   Continue fluconazole for oral thrush  Continue reglan  Tylenol IV PRN for pain   Psych consult for suicidal ideations with plan, consider 1:1 observation   Will discuss with hospitalist Failure to thrive in setting of Squamous cell CA of right tonsil status post tonsillectomy, chemotherapy, and radiation therapy 3 months ago  CXR: MARY  Ekg: SR @ 54 bpm  UA neg: COVID neg   RVP  neg  Nutrition consult- Start PEG feed w/high protein  Keep pt NPO for now, Speech and swallow-P  IVF ; Onc will follow   Continue fluconazole for oral thrush  Continue reglan  Tylenol IV PRN for pain   Will discuss with hospitalist

## 2021-09-18 LAB
CULTURE RESULTS: NO GROWTH — SIGNIFICANT CHANGE UP
SPECIMEN SOURCE: SIGNIFICANT CHANGE UP

## 2021-09-18 PROCEDURE — 99233 SBSQ HOSP IP/OBS HIGH 50: CPT

## 2021-09-18 RX ORDER — HALOPERIDOL DECANOATE 100 MG/ML
5 INJECTION INTRAMUSCULAR EVERY 6 HOURS
Refills: 0 | Status: DISCONTINUED | OUTPATIENT
Start: 2021-09-18 | End: 2021-09-22

## 2021-09-18 RX ORDER — QUETIAPINE FUMARATE 200 MG/1
25 TABLET, FILM COATED ORAL
Refills: 0 | Status: DISCONTINUED | OUTPATIENT
Start: 2021-09-18 | End: 2021-09-22

## 2021-09-18 RX ORDER — HALOPERIDOL DECANOATE 100 MG/ML
5 INJECTION INTRAMUSCULAR EVERY 6 HOURS
Refills: 0 | Status: DISCONTINUED | OUTPATIENT
Start: 2021-09-18 | End: 2021-09-18

## 2021-09-18 RX ORDER — QUETIAPINE FUMARATE 200 MG/1
50 TABLET, FILM COATED ORAL AT BEDTIME
Refills: 0 | Status: DISCONTINUED | OUTPATIENT
Start: 2021-09-18 | End: 2021-09-19

## 2021-09-18 RX ADMIN — QUETIAPINE FUMARATE 50 MILLIGRAM(S): 200 TABLET, FILM COATED ORAL at 21:06

## 2021-09-18 RX ADMIN — Medication 2 MILLIGRAM(S): at 10:59

## 2021-09-18 RX ADMIN — HEPARIN SODIUM 5000 UNIT(S): 5000 INJECTION INTRAVENOUS; SUBCUTANEOUS at 15:34

## 2021-09-18 RX ADMIN — MIRTAZAPINE 15 MILLIGRAM(S): 45 TABLET, ORALLY DISINTEGRATING ORAL at 21:06

## 2021-09-18 RX ADMIN — FLUCONAZOLE 100 MILLIGRAM(S): 150 TABLET ORAL at 15:14

## 2021-09-18 NOTE — CHART NOTE - NSCHARTNOTEFT_GEN_A_CORE
Pt requested to speak to a provider. Spoke to the patient, answered all questions, and addressed all concerns. Pt explaining that "he loves himself and did not try to kill himself".  He requested to have the 1:1 cancelled. I explained that it will have to stay like this for the remainder of the night and it will be readdressed in am.
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
## 2021-09-19 DIAGNOSIS — Z78.9 OTHER SPECIFIED HEALTH STATUS: ICD-10-CM

## 2021-09-19 PROCEDURE — 99232 SBSQ HOSP IP/OBS MODERATE 35: CPT

## 2021-09-19 RX ORDER — QUETIAPINE FUMARATE 200 MG/1
100 TABLET, FILM COATED ORAL AT BEDTIME
Refills: 0 | Status: DISCONTINUED | OUTPATIENT
Start: 2021-09-19 | End: 2021-09-22

## 2021-09-19 RX ADMIN — Medication 650 MILLIGRAM(S): at 22:00

## 2021-09-19 RX ADMIN — QUETIAPINE FUMARATE 100 MILLIGRAM(S): 200 TABLET, FILM COATED ORAL at 21:23

## 2021-09-19 RX ADMIN — Medication 650 MILLIGRAM(S): at 21:22

## 2021-09-19 RX ADMIN — Medication 2 MILLIGRAM(S): at 00:30

## 2021-09-19 RX ADMIN — HEPARIN SODIUM 5000 UNIT(S): 5000 INJECTION INTRAVENOUS; SUBCUTANEOUS at 07:46

## 2021-09-19 RX ADMIN — MIRTAZAPINE 15 MILLIGRAM(S): 45 TABLET, ORALLY DISINTEGRATING ORAL at 21:23

## 2021-09-19 RX ADMIN — HALOPERIDOL DECANOATE 5 MILLIGRAM(S): 100 INJECTION INTRAMUSCULAR at 00:30

## 2021-09-19 RX ADMIN — QUETIAPINE FUMARATE 25 MILLIGRAM(S): 200 TABLET, FILM COATED ORAL at 07:46

## 2021-09-19 NOTE — DIETITIAN INITIAL EVALUATION ADULT. - ENTERAL
If continuous feeding regimen is appropriate, may consider TwoCalHN at 40ml/hr x 24 hours (960ml total volume, 1920kcal, 80gm protein, 672ml water via formula); If bolus feeds are to be maintained, suggest increase in bolus volume to 240ml every 6 hours (will also provide 960ml formula, 1920kcal, 80gm protein and 672ml water via formula). Suggest adding ProSource No Carb 1 packet daily via enteral route for addl 60kcal, 15gm protein.  Defer additional free water flushes to physician / ACP.

## 2021-09-19 NOTE — DIETITIAN INITIAL EVALUATION ADULT. - ORAL INTAKE PTA/DIET HISTORY
Unable to obtain nutrition h/o due to current status - threatening, aggressive agitated, on 4 point restraints; per chart using PEG for feeds, "real food blend + boost shakes" noted.
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
## 2021-09-19 NOTE — DIETITIAN INITIAL EVALUATION ADULT. - OTHER INFO
Nutrition consulted for unintentional weight loss PTA,  Patient with FTT, wt loss and poor PO intake over several months; also with SI, depression, on 1:1 and restrained.    Per RN, patient currently agitated, therefore, RD was unable to interview patient or conduct a nutrition focused physical exam. Per RN, patient was not accepting of his feeds, he reported he wanted to do it himself, however, due to restraints, is not feasible at this time.    Per chart review, patient reported by spouse to have a poor appetite x 4 months; reported patient with a 50 pound weight loss x 6 months.  Obtained weight history from HIE section of the EMR: weight x 6 months ago 72.5kg (March 2021); current admission weight 9/17/21 - 56.7kg; weight loss of 15.8kg (~35 pounds), 21.8% weight loss x 6 months, significant.    SLP assessment reviewed from 9/17/21 in which it was recommended non-oral means of nutrition, however, patient ordered for DASH/TLC (cholesterol and sodium restricted) diet - to clarify with ACP if this remains appropriate at this time. Nutrition consulted for unintentional weight loss PTA,  Patient with FTT, wt loss and poor PO intake over several months; also with SI, depression, on 1:1 and restrained.    Per RN, patient currently agitated, therefore, RD was unable to interview patient or conduct a nutrition focused physical exam. Per RN, patient was not accepting of his feeds, he reported he wanted to do it himself, however, due to restraints, is not feasible at this time.    Per chart review, patient reported by spouse to have a poor appetite x 4 months; reported patient with a 50 pound weight loss x 6 months.  Obtained weight history from HIE section of the EMR: weight x 6 months ago 72.5kg (March 2021); current admission weight 9/17/21 - 56.7kg; weight loss of 15.8kg (~35 pounds), 21.8% weight loss x 6 months, significant.    SLP assessment reviewed from 9/17/21 in which it was recommended non-oral means of nutrition, however, patient ordered for DASH/TLC (cholesterol and sodium restricted) diet - per SLP assessment, continue with oral diet as tolerated with recommendation for cinesophagram.
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
## 2021-09-19 NOTE — DIETITIAN INITIAL EVALUATION ADULT. - ADD RECOMMEND
1) Monitor weights, enteral feeding tolerance, skin integrity, pertinent labs.  2) PO diet per physician discretion, must note per SLP assessment, non-oral means of nutrition recommended. 1) Monitor weights, enteral feeding tolerance, skin integrity, pertinent labs.  2) PO diet per physician discretion/pending cinesophagram. 1) Monitor weights, enteral feeding tolerance, skin integrity, pertinent labs.  2) PO diet per physician discretion/pending cinesophagram. 3) Monitor % PO intake and further adjust enteral feeds accordingly.

## 2021-09-19 NOTE — DIETITIAN INITIAL EVALUATION ADULT. - PERTINENT LABORATORY DATA
09-17 Na141 mmol/L Glu 84 mg/dL K+ 3.9 mmol/L Cr  1.12 mg/dL BUN 16 mg/dL 09-17 Phos 3.8 mg/dL 09-17 Alb 4.4 g/dL

## 2021-09-19 NOTE — DIETITIAN INITIAL EVALUATION ADULT. - PERTINENT MEDS FT
MEDICATIONS  (STANDING):  fluconAZOLE   Tablet 100 milliGRAM(s) Oral daily  heparin   Injectable 5000 Unit(s) SubCutaneous every 8 hours  influenza   Vaccine 0.5 milliLiter(s) IntraMuscular once  LORazepam   Injectable 2 milliGRAM(s) IntraMuscular once  mirtazapine 15 milliGRAM(s) Oral at bedtime  QUEtiapine 25 milliGRAM(s) Oral <User Schedule>  QUEtiapine 50 milliGRAM(s) Oral at bedtime    MEDICATIONS  (PRN):  acetaminophen   Tablet .. 650 milliGRAM(s) Oral every 6 hours PRN Temp greater or equal to 38.5C (101.3F), Mild Pain (1 - 3)  aluminum hydroxide/magnesium hydroxide/simethicone Suspension 30 milliLiter(s) Oral every 4 hours PRN Dyspepsia  haloperidol     Tablet 5 milliGRAM(s) Oral every 6 hours PRN SEVERE AGITATION  haloperidol    Injectable 5 milliGRAM(s) IntraMuscular every 6 hours PRN Severe Agitation  LORazepam     Tablet 2 milliGRAM(s) Oral every 6 hours PRN Severe agitation  LORazepam   Injectable 2 milliGRAM(s) IntraMuscular every 6 hours PRN severe anxiety/agitation  melatonin 3 milliGRAM(s) Oral at bedtime PRN Insomnia  metoclopramide 10 milliGRAM(s) Oral every 6 hours PRN gastroparesis  ondansetron Injectable 4 milliGRAM(s) IV Push every 8 hours PRN Nausea and/or Vomiting  QUEtiapine 25 milliGRAM(s) Oral every 6 hours PRN anxiety/agitation

## 2021-09-19 NOTE — CHART NOTE - NSCHARTNOTEFT_GEN_A_CORE
Called by the RN that the patient was cooperative, and calm. As per the RN - pt exhibiting no signs of aggression.     Vital Signs Last 24 Hrs  T(C): 36.5 (19 Sep 2021 06:19), Max: 36.5 (19 Sep 2021 06:19)  T(F): 97.7 (19 Sep 2021 06:19), Max: 97.7 (19 Sep 2021 06:19)  HR: 95 (19 Sep 2021 06:19) (95 - 95)  BP: 149/84 (19 Sep 2021 06:19) (149/84 - 149/84)  BP(mean): --  RR: 19 (19 Sep 2021 06:19) (19 - 19)  SpO2: 98% (19 Sep 2021 06:19) (98% - 98%)    -two point leg restraints dcd Called by the RN that the patient was cooperative, and calm. As per the RN - pt exhibiting no signs of aggression. Spoke with the patient and he apologized for his behavior. He appeared sincere. He was calm and cooperative.     Vital Signs Last 24 Hrs  T(C): 36.5 (19 Sep 2021 06:19), Max: 36.5 (19 Sep 2021 06:19)  T(F): 97.7 (19 Sep 2021 06:19), Max: 97.7 (19 Sep 2021 06:19)  HR: 95 (19 Sep 2021 06:19) (95 - 95)  BP: 149/84 (19 Sep 2021 06:19) (149/84 - 149/84)  BP(mean): --  RR: 19 (19 Sep 2021 06:19) (19 - 19)  SpO2: 98% (19 Sep 2021 06:19) (98% - 98%)    -two point leg restraints dcd

## 2021-09-19 NOTE — DIETITIAN INITIAL EVALUATION ADULT. - PROBLEM SELECTOR PLAN 1
Failure to thrive in setting of Squamous cell CA of right tonsil status post tonsillectomy, chemotherapy, and radiation therapy 3 months ago  CXR: MARY  Ekg: SR @ 54 bpm  UA neg: COVID neg   RVP  neg  Nutrition consult- Start PEG feed w/high protein  Keep pt NPO for now, Speech and swallow-P  IVF ; Onc will follow   Continue fluconazole for oral thrush  Continue reglan  Tylenol IV PRN for pain   Will discuss with hospitalist

## 2021-09-19 NOTE — DIETITIAN INITIAL EVALUATION ADULT. - PROBLEM SELECTOR PLAN 2
Ekg: SR @ 54 bpm  r/o ACS, order cardiac enzymes   currently chest pain free  monitor vital signs q4hrs

## 2021-09-20 ENCOUNTER — APPOINTMENT (OUTPATIENT)
Dept: RADIATION ONCOLOGY | Facility: CLINIC | Age: 56
End: 2021-09-20

## 2021-09-20 PROCEDURE — 99232 SBSQ HOSP IP/OBS MODERATE 35: CPT

## 2021-09-20 PROCEDURE — 99233 SBSQ HOSP IP/OBS HIGH 50: CPT

## 2021-09-20 PROCEDURE — 93306 TTE W/DOPPLER COMPLETE: CPT | Mod: 26

## 2021-09-20 PROCEDURE — 70450 CT HEAD/BRAIN W/O DYE: CPT | Mod: 26

## 2021-09-20 PROCEDURE — 74230 X-RAY XM SWLNG FUNCJ C+: CPT | Mod: 26

## 2021-09-20 RX ADMIN — FLUCONAZOLE 100 MILLIGRAM(S): 150 TABLET ORAL at 12:34

## 2021-09-20 RX ADMIN — MIRTAZAPINE 15 MILLIGRAM(S): 45 TABLET, ORALLY DISINTEGRATING ORAL at 21:25

## 2021-09-20 RX ADMIN — Medication 10 MILLIGRAM(S): at 21:26

## 2021-09-20 RX ADMIN — QUETIAPINE FUMARATE 25 MILLIGRAM(S): 200 TABLET, FILM COATED ORAL at 08:35

## 2021-09-20 RX ADMIN — QUETIAPINE FUMARATE 100 MILLIGRAM(S): 200 TABLET, FILM COATED ORAL at 21:25

## 2021-09-20 RX ADMIN — HEPARIN SODIUM 5000 UNIT(S): 5000 INJECTION INTRAVENOUS; SUBCUTANEOUS at 21:25

## 2021-09-20 RX ADMIN — Medication 3 MILLIGRAM(S): at 02:55

## 2021-09-20 NOTE — PROVIDER CONTACT NOTE (OTHER) - SITUATION
Refusing labs
Pt refusing bolus tube feeds
Pt requesting restraints be taken off. states he made a mistake yesterday and is doing "all the right things" to get them taken off. As RN left the room he said "But ill kill you"
REFUSING VITALS & MEDS

## 2021-09-20 NOTE — BH CONSULTATION LIAISON PROGRESS NOTE - NSBHCONSULTRECOMMENDOTHER_PSY_A_CORE FT
?inpatient psychiatric admissions when medically stable
?inpatient psychiatric admissions when medically stable
pending collateral at this time to determine dispo   VM placed with wife.

## 2021-09-20 NOTE — HISTORY OF PRESENT ILLNESS
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 \par Lymph node, right level 2 biopsy positive for squamous cell carcinoma on March 2 2021; reportedly HPV/P16 negative. \par Completed RT to oropharynx/neck 6/4/2021 total dose 70 Gy. \par \par PET scan on 9/13/21 shows: \par Resolution of hypermetabolism associated with right level 2 cervical lymphadenopathy which is decreased in size, compatible with response to interval therapy.No evidence of FDG-avid disease. No new lesion.\par \par Presents today for post treatment evaluation.

## 2021-09-20 NOTE — BH CONSULTATION LIAISON PROGRESS NOTE - NSBHFUPINTERVALHXFT_PSY_A_CORE
Patient was seen and assessed at bedside. patient currently alert, oriented, on CO as well as security remains at bedside due to previous threatening behaviors, however patient has not required restraints today nor PRNs.  Patient states he is different today, and no longer wants to harm himself or anyone else.  He states previously, he was suicidal because of his weight loss he figured there was no other way out, however now feels he is improving.  He states he was only threatening as people were consistently asking him to give blood, or go to tests which he knew were not necessary.  Patient states threatening behavior is due "to they way I grew up and where I live.  in a problem we fight, go knuckle up."  He denies having intent to act on his previous statements. Patient does report recent THC use, denies other substances. NO anhtony, denies depression, no psychosis.  Patient was seen and assessed at bedside. patient currently alert, oriented, on CO as well as security remains at bedside due to previous threatening behaviors, however patient has not required restraints today nor PRNs.  Patient states he is different today, and no longer wants to harm himself or anyone else.  He states previously, he was suicidal because of his weight loss he figured there was no other way out, however now feels he is improving.  He states he was only threatening as people were consistently asking him to give blood, or go to tests which he knew were not necessary.  Patient states threatening behavior is due "to they way I grew up and where I live.  in a problem we fight, go knuckle up."  He denies having intent to act on his previous statements. Patient does report recent THC use, denies other substances. NO anthony, denies depression, no psychosis.     Spoke with wife. As per wife, patient has been depressed much of his life but is no worse off now. He is an "attention seeker" and makes comments" when he frustrated and angry because he does not know how else to handle himself".  Wife reports, "this is sandra". She also reports not feeling scared of him and will accept him home. Does not feel need for further psychiatric care. Wife states "he does not have a gun to shoot anyone, he lied"
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
## 2021-09-20 NOTE — SWALLOW VFSS/MBS ASSESSMENT ADULT - COMMENTS
Per H&P, patient is a "55 yo male with PMH of Squamous Cell Carcinoma of right tonsils s/p tonsillectemy 3/2021, chemo and radiation 3 months ago in remission presenting with generalized weakness / FTT. Patient states he does not have energy to do anything, feels that he can barely walk around due to his weakness and body aches worsening over the past week. + Difficulty swallowing food, feeling like the food is getting "stuck" in his throat since finishing radiation therapy 6 months ago, patient admits not being able to eat with his family attributes to his depression. Patient c/o associated loss of appetite, inability to taste and dry tongue / mouth for the past 4 weeks; and despite using PEG for feeds (real food blend + boost shakes), he continues to lose weight."    WBC is low. Most recent CXR revealed "clear lungs."    Patient is known to this department as he was seen as an outpatient at [facility] on 4/5/21 for a clinical swallow assessment, at which time regular solids with thin liquids was recommended, per [employer] documentation. Patient seen for clinical swallow evaluation during this admission on 9/17/21, please refer to report for full details.      SLP received patient in radiology suite, awake, alert, able to follow directions, answer questions and engage in conversation.  Patient reporting difficulty swallowing solids, reporting "it gets stuck" and points to throat. Pt states that utilizing a liquid wash helps reduce globus sensation. Patient further reports his mouth feeling very dry and not being able to tastes food/liquids.

## 2021-09-20 NOTE — SWALLOW VFSS/MBS ASSESSMENT ADULT - DIAGNOSTIC IMPRESSIONS
Patient presents with Functional Oral Phase and Mild Pharyngeal Dysphagia.    Oral phase characterized by adequate acceptance, adequate anterior bolus containment, functional manipulation, adequate tongue to palate seal, adequate anterior to posterior transport and adequate oral cavity clearance. Pharyngeal phase characterized by delayed pharyngeal swallow initiating at the lateral surface of the epiglottis.  There is adequate base of tongue retraction, reduced hyolaryngeal elevation and excursion, adequate pharyngeal contraction and reduced laryngeal vestibule closure.  There is adequate pharyngeal clearance. There is No Laryngeal Penetration or Aspiration before, during or after the swallow with puree, tablet, honey thick liquids and nectar thick liquids.  There is Laryngeal Penetration during the swallow without complete retrieval leaving trace residue in the laryngeal vestibule above the level of the vocal folds.  Small Sips reduced the amount/depth of laryngeal penetration maintaining adequate airway protection with thin liquids.    Of note, patient reported a texture aversion to solid cookie coated with barium pudding “I cannot mix foods it makes me vomit.”  Patient trialed solid cookie coated with barium pudding, but was not able to swallow trial and expectorated it.  Therefore, SLP administered tablet in lieu of solid.

## 2021-09-20 NOTE — PROVIDER CONTACT NOTE (OTHER) - ASSESSMENT
pt alert & oriented. Agitated
Alert & oriented. uncooperative & aggressive. Verbally abusive to staff
pt on 4 point restraints. refusing medications & vitals at this time.
A&Ox4

## 2021-09-20 NOTE — SWALLOW VFSS/MBS ASSESSMENT ADULT - PHARYNGEAL PHASE COMMENTS
There is adequate base of tongue retraction, reduced hyolaryngeal elevation and excursion, adequate pharyngeal contraction and reduced laryngeal vestibule closure.

## 2021-09-20 NOTE — SWALLOW VFSS/MBS ASSESSMENT ADULT - ADDITIONAL RECOMMENDATIONS
1. Monitor tolerance and reconsult this service as indicated  2. Swallow therapy at [facility] [phone]

## 2021-09-21 ENCOUNTER — TRANSCRIPTION ENCOUNTER (OUTPATIENT)
Age: 56
End: 2021-09-21

## 2021-09-21 DIAGNOSIS — I50.42 CHRONIC COMBINED SYSTOLIC (CONGESTIVE) AND DIASTOLIC (CONGESTIVE) HEART FAILURE: ICD-10-CM

## 2021-09-21 PROCEDURE — 99233 SBSQ HOSP IP/OBS HIGH 50: CPT

## 2021-09-21 RX ORDER — ASPIRIN/CALCIUM CARB/MAGNESIUM 324 MG
81 TABLET ORAL DAILY
Refills: 0 | Status: DISCONTINUED | OUTPATIENT
Start: 2021-09-21 | End: 2021-09-22

## 2021-09-21 RX ORDER — HEPARIN SODIUM 5000 [USP'U]/ML
5000 INJECTION INTRAVENOUS; SUBCUTANEOUS EVERY 8 HOURS
Refills: 0 | Status: DISCONTINUED | OUTPATIENT
Start: 2021-09-21 | End: 2021-09-22

## 2021-09-21 RX ADMIN — MIRTAZAPINE 15 MILLIGRAM(S): 45 TABLET, ORALLY DISINTEGRATING ORAL at 22:03

## 2021-09-21 RX ADMIN — QUETIAPINE FUMARATE 25 MILLIGRAM(S): 200 TABLET, FILM COATED ORAL at 09:04

## 2021-09-21 RX ADMIN — HEPARIN SODIUM 5000 UNIT(S): 5000 INJECTION INTRAVENOUS; SUBCUTANEOUS at 22:03

## 2021-09-21 RX ADMIN — Medication 81 MILLIGRAM(S): at 16:43

## 2021-09-21 RX ADMIN — FLUCONAZOLE 100 MILLIGRAM(S): 150 TABLET ORAL at 16:43

## 2021-09-21 RX ADMIN — QUETIAPINE FUMARATE 100 MILLIGRAM(S): 200 TABLET, FILM COATED ORAL at 22:03

## 2021-09-21 NOTE — BH CONSULTATION LIAISON PROGRESS NOTE - NSBHCONSFOLLOWNEEDS_PSY_ALL_CORE
Needs further psych safety assessment prior to discharge
No psychiatric contraindications to discharge
Needs further psych safety assessment prior to discharge
Needs further psych safety assessment prior to discharge

## 2021-09-21 NOTE — DISCHARGE NOTE PROVIDER - CARE PROVIDER_API CALL
Radha Quiroz)  Internal Medicine; Medical Oncology  [address] [place] [address]  Phone: [phone]  Fax: [phone]  Follow Up Time:    Radha Quiroz)  Internal Medicine; Medical Oncology  [address] [place] [address]  Phone: [phone]  Fax: [phone]  Follow Up Time:     DERRICK KYMARY JO On license of [facility]  [address] [address]  Phone: [phone]  Fax: [phone]  Follow Up Time:

## 2021-09-21 NOTE — CHART NOTE - NSCHARTNOTEFT_GEN_A_CORE
Patient seen in follow up for nutrition consult for unintentional weight loss PTA. Refer to RD note (9/19) for full admission assessment as nutrition was consulted for assessment/unintentional weight loss PTA. Spoke with patient and he reports no intolerances to tube feed provision, Bolus Two Shemar HN via GT @ 240 mL every 6 hrs. (4x daily total). Provides 1,920 kcal (33.9 kcal/kg), 80.2 g pro (1.4 g/kg) and 672 mL of fluid (11.9 mL/kg) (TF free water) based on ABW 56.7 kg. Total volume is 960 mL. Educated patient to take bolus feeds as ordered to maintain optimal nutritional status. Patient s/p swallow evaluation (9/20) with recommendations to continue regular/thin liquids. Noted finishing 25-50% of meals per RN flow sheet. Per discussion of food preferences, patient expressed wanting to consume ensure enlive (350 kcal, 20 g pro) to further help meet nutritional needs. Spoke with PA, and order was placed for ensure enlive 1x daily. Spoke with patients wife (Yasir) over phone and was in agreement.    Recommend  - Continue PO diet as ordered: DASH/TLC  - Continue tube feed provision as ordered: Bolus Two Shemar HN via GT @ 240 mL every 6 hrs. (4x daily total)  - Provide ensure enlive 1x daily (350 kcal, 20 g pro)  - Continue to obtain food preferences as needed  - Obtain weights weekly to monitor efficacy of interventions

## 2021-09-21 NOTE — BH CONSULTATION LIAISON PROGRESS NOTE - NSBHFUPINTERVALCCFT_PSY_A_CORE
f/u depression, suicidality, agitation
"The food here is making me loose more weight" 
"Why am I on suicide?"
"im past that I don't want to kill myself"

## 2021-09-21 NOTE — BH CONSULTATION LIAISON PROGRESS NOTE - NSBHCONSULTMEDPRNREASON_PSY_A_CORE
anxiety.../agitation.../severe agitation...
agitation...

## 2021-09-21 NOTE — BH CONSULTATION LIAISON PROGRESS NOTE - CASE SUMMARY
Chart reviewed. I personally saw and examined the patient with NP Harini. Patient adamantly denies SI/HI. He shows poor insight into what he said over the weekend and late last week- however he states that he deals with confrontation by fighting. He repeats that he will not hurt anyone that does not try to hurt him. He does not show signs of psychosis, paranoia, delusions. His speech is grossly wnl. His affect is rather euthymic. His thought process was grossly linear during the encounter.  Per primary attending- collateral yesterday was concerned for the patient using drugs and concerned for erratic behavior. The collateral obtained by the same person today (his wife), is no longer demonstrating concern for his imminent safety and again chalks up his rhetoric as "this is Shahid." It is possible that due to patient's past history, he resorts to threatening and escalating rhetoric as a means to defend himself without real intent. At this point, I do not believe he would benefit from psychiatric admission as I am not seeing any clear primary psychiatric illness- other than perhaps antisocial personality disorder.  Will follow up with patient tomorrow- do not allow patient to leave AMA without psych re-assessment. If patient continues to be in good behavioral control and denies SI/HI with wife continuing to accept him humera- likely can be discharged home, though will clarify tomorrow.   may consider speaking to wife about safety options, restraining orders, working with police if there is any domestic concerns.  Will also SAFEAct patient.  Psych CL to follow.
Chart reviewed. Pt seen with NP. Calm, pleasant, perseverative about wanting to go home (feels he eats/gains weight better there). Also remorseful re: SI/HI comments, says they were in a moment of frustration and adamantly denies any SI/HI with intent or plan currently. Cites numerous areas he wants to get back to (family, Sabianism, working on motorcycle, etc). Was open to referrals for counseling and anger management as well. Finally, family is supportive of a return home and denied any overt safety concerns. As such, he currently lacks any concerning symptoms or behaviors warranting 1:1 constant observation or inpatient psychiatric care. We strongly suggest stopping the 1:1 and discharging the patient to the community ASAP. Discussed with primary team. Will continue to follow while he is here, but there is no psych contraindication to discharge when medically cleared.

## 2021-09-21 NOTE — DISCHARGE NOTE PROVIDER - NSDCMRMEDTOKEN_GEN_ALL_CORE_FT
fluconazole 40 mg/mL oral liquid: Take 5 milliliter(s) orally on day 1 and then take 2.5 milliliter(s) orally on days 2-14    **Per spouse, patient is currently on day 3 of fluconazole   hydrocodone-acetaminophen 10 mg-325 mg oral tablet: 1 tab(s) orally every 6 hours, As Needed for Pain    **Per [facility] (reference #[id number]), rx dispensed 8/19/21 x 30 day supply  metoclopramide 10 mg oral tablet: 1 tab(s) orally every 6 hours, As Needed   aspirin 81 mg oral delayed release tablet: 1 tab(s) orally once a day  enalapril 2.5 mg oral tablet: 1 tab(s) orally once a day  fluconazole 100 mg oral tablet: 1 tab(s) orally once a day  hydrocodone-acetaminophen 10 mg-325 mg oral tablet: 1 tab(s) orally every 6 hours, As Needed for Pain    **Per [facility] (reference #[id number]), rx dispensed 8/19/21 x 30 day supply  metoclopramide 10 mg oral tablet: 1 tab(s) orally every 6 hours, As Needed  mirtazapine 15 mg oral tablet: 1 tab(s) orally once a day (at bedtime)  QUEtiapine 100 mg oral tablet: 1 tab(s) orally once a day (at bedtime)  QUEtiapine 25 mg oral tablet: 1 tab(s) orally once a day at 8am  Toprol-XL 25 mg oral tablet, extended release: 0.5 tab(s) orally once a day   Twocal HN Bolus feeds via Peg tube 240: 240 milliliter(s) by gastrostomy tube every 6 hours     Total 960ml per 24hrs    Dispense: 1 month Supply

## 2021-09-21 NOTE — BH CONSULTATION LIAISON PROGRESS NOTE - NSBHFUPINTERVALHXFT_PSY_A_CORE
Patient was seen and assessed at bedside. patient currently alert, oriented, with CO in place. Pt denies si/hi/sib /vh/ah ,  pt required no PRN overnight.  Patient states he's looking forward to going home to see his family and continue playing music and riding his motorcycle . He regrets stating he wanted to kill himself and will be cautious in saying that going forward. He states he suicidal ideation was strictly out of frustration and does not have any intention of hurting himself or others      Patient was seen and assessed at bedside. patient currently alert, calm, oriented, with CO in place. Pt denies si/hi/sib /vh/ah ,  pt required no PRN overnight.  Patient states he's looking forward to going home to see his family and continue playing music and riding his motorcycle . He regrets stating he wanted to kill himself and will be cautious in saying that going forward. He states he suicidal ideation was strictly out of frustration and does not have any intention of hurting himself or others. no anthony. hopeful, help seeking.
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
## 2021-09-21 NOTE — BH CONSULTATION LIAISON PROGRESS NOTE - NSBHMSETHTPROC_PSY_A_CORE
Linear/Perseverative/Normal reasoning
Linear
Linear/Perseverative/Normal reasoning
Linear/Normal reasoning

## 2021-09-21 NOTE — DISCHARGE NOTE PROVIDER - HOSPITAL COURSE
55 y/o M with PMH of Squamos Cell Carcinoma of right tonsills s/p tonsillectemy 3/2021,  chemo and radiation 3 months ago in remission presenting with generalized weakness/ FTT. Patient states he does not have energy to do anything, feels that he can barely walk around due to his weakness. + Difficulty swallowing food; and despite using PEG for feeds ( real food blend+ boost shakes), continues to lose weight. Appx >50lbs over last 6 months.    Hospital course:     Failure to thrive in setting of Squamous cell CA of right tonsil status post tonsillectomy, chemotherapy, and radiation therapy   -CXR: MARY   Trop 16--> 17  -Ekg: SR @ 54 bpm  -UA neg: COVID neg    -S/p IVF  -Onc consulted: Outpatient oncology f/u  -Fuconazole for oral thrush  -Reglan for nausea   -Cinesophagram: Regular solids and Thin liquids Small Sips  -[place] consulted: PO diet and bolus TF, added ensure enlive qd      Major depressive disorder, Severe Agitation  -Psych consulted: c/w Remeron 15mg hs, Seroquel 25mg qAM/100mg qHS PO  -Agitation/Anxiety: Seroquel 25mg q6h prn-hold if qtc >500  -Severe Agitation: Haldol 5mg + Ativan 2mg q6h PO/IM prn  -Pt required 1:1 and restraints, however no longer SI/HI - thus no longer requires 1:1 per psych  -No indication for [facility] admission for psych  -CTH negative       Moderate LV dysfunction  -echo with new moderate LV systolic dysfunction and mild diastolic dysfunction  -trops 16--17  -pt would not like any invasive procedures, would like medical management  -started asa 81mg daily and enalapril 2.5mg daily  -can start metoprolol 12.5mg daily in future if BP tolerates     Pt medically cleared for discharge on ____ as per discussion with _____.  Dispo: home        57 y/o M with PMH of Squamos Cell Carcinoma of right tonsills s/p tonsillectemy 3/2021,  chemo and radiation 3 months ago in remission presenting with generalized weakness/ FTT. Patient states he does not have energy to do anything, feels that he can barely walk around due to his weakness. + Difficulty swallowing food; and despite using PEG for feeds ( real food blend+ boost shakes), continues to lose weight. Appx >50lbs over last 6 months.    Hospital course:     Failure to thrive in setting of Squamous cell CA of right tonsil status post tonsillectomy, chemotherapy, and radiation therapy   -CXR: MARY   Trop 16--> 17  -Ekg: SR @ 54 bpm  -UA neg: COVID neg    -S/p IVF  -Onc consulted: Outpatient oncology f/u  -Fuconazole for oral thrush  -Reglan for nausea   -Cinesophagram: Regular solids and Thin liquids Small Sips  -[place] consulted: PO diet and bolus TF, added ensure enlive qd    Major depressive disorder, Severe Agitation  -Psych consulted: c/w Remeron 15mg hs, Seroquel 25mg qAM/100mg qHS PO  -Agitation/Anxiety: Seroquel 25mg q6h prn-hold if qtc >500  -Severe Agitation: Haldol 5mg + Ativan 2mg q6h PO/IM prn  -Pt required 1:1 and restraints, however no longer SI/HI - thus no longer requires 1:1 per psych  -No indication for [facility] admission for psych  -CTH negative       Moderate LV dysfunction  -echo with new moderate LV systolic dysfunction and mild diastolic dysfunction  -trops 16--17  -pt would not like any invasive procedures, would like medical management  -started asa 81mg daily and enalapril 2.5mg daily  -started on Toprol, XL 9/22    Pt medically cleared for discharge on  9/22/21 as per discussion with Dr. Silva  Dispo: home

## 2021-09-21 NOTE — BH CONSULTATION LIAISON PROGRESS NOTE - NSBHCHARTREVIEWVS_PSY_A_CORE FT
Vital Signs Last 24 Hrs  T(C): 36.7 (21 Sep 2021 13:09), Max: 36.9 (20 Sep 2021 22:07)  T(F): 98 (21 Sep 2021 13:09), Max: 98.4 (20 Sep 2021 22:07)  HR: 78 (21 Sep 2021 04:00) (78 - 85)  BP: 121/85 (21 Sep 2021 13:09) (105/68 - 121/85)  BP(mean): --  RR: 18 (21 Sep 2021 13:09) (18 - 18)  SpO2: 99% (21 Sep 2021 13:09) (98% - 100%)
Vital Signs Last 24 Hrs  T(C): 36.4 (18 Sep 2021 14:04), Max: 36.5 (17 Sep 2021 22:33)  T(F): 97.5 (18 Sep 2021 14:04), Max: 97.7 (17 Sep 2021 22:33)  HR: 81 (18 Sep 2021 14:04) (78 - 81)  BP: 128/96 (18 Sep 2021 14:04) (126/77 - 128/96)  BP(mean): --  RR: 19 (18 Sep 2021 14:04) (18 - 19)  SpO2: 98% (18 Sep 2021 14:04) (98% - 100%)
Vital Signs Last 24 Hrs  T(C): 36.5 (19 Sep 2021 06:19), Max: 36.5 (19 Sep 2021 06:19)  T(F): 97.7 (19 Sep 2021 06:19), Max: 97.7 (19 Sep 2021 06:19)  HR: 95 (19 Sep 2021 06:19) (81 - 95)  BP: 149/84 (19 Sep 2021 06:19) (128/96 - 149/84)  BP(mean): --  RR: 19 (19 Sep 2021 06:19) (19 - 19)  SpO2: 98% (19 Sep 2021 06:19) (98% - 98%)
Vital Signs Last 24 Hrs  T(C): 36.4 (20 Sep 2021 13:49), Max: 36.7 (20 Sep 2021 03:25)  T(F): 97.5 (20 Sep 2021 13:49), Max: 98 (20 Sep 2021 03:25)  HR: 106 (20 Sep 2021 13:49) (67 - 106)  BP: 114/87 (20 Sep 2021 13:49) (114/87 - 122/86)  BP(mean): --  RR: 19 (20 Sep 2021 13:49) (16 - 19)  SpO2: 96% (20 Sep 2021 13:49) (96% - 99%)

## 2021-09-21 NOTE — DISCHARGE NOTE PROVIDER - NSDCCPCAREPLAN_GEN_ALL_CORE_FT
PRINCIPAL DISCHARGE DIAGNOSIS  Diagnosis: Adult failure to thrive  Assessment and Plan of Treatment: continue supplemental nutrition as ordered      SECONDARY DISCHARGE DIAGNOSES  Diagnosis: Depression  Assessment and Plan of Treatment: continue medication as ordered  You can follow up at Health system [phone] (M-F 9am-7pm)    Diagnosis: Dysphagia  Assessment and Plan of Treatment: continue supplemental nuttrition as ordered    Diagnosis: Tonsillar cancer  Assessment and Plan of Treatment: follow up with your Oncologist at [facility]. Call to schedule an appointment    Diagnosis: LV dysfunction  Assessment and Plan of Treatment: continue medication as presribed.  Follow up with your Primary Care Doctor within 1 week of discharge. Call to schedule an appointment

## 2021-09-21 NOTE — CHART NOTE - NSCHARTNOTEFT_GEN_A_CORE
Pt's wife Yasir updated via phone ([phone]), including new finding of LV dysfunction and new cardiac medications (enalapril and asa). All questions answered.

## 2021-09-21 NOTE — DISCHARGE NOTE PROVIDER - CARE PROVIDERS DIRECT ADDRESSES
,[email or web address]_Unknown
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
## 2021-09-21 NOTE — BH CONSULTATION LIAISON PROGRESS NOTE - CURRENT MEDICATION
MEDICATIONS  (STANDING):  fluconAZOLE   Tablet 100 milliGRAM(s) Oral daily  heparin   Injectable 5000 Unit(s) SubCutaneous every 8 hours  influenza   Vaccine 0.5 milliLiter(s) IntraMuscular once  LORazepam   Injectable 2 milliGRAM(s) IntraMuscular once  mirtazapine 15 milliGRAM(s) Oral at bedtime  QUEtiapine 25 milliGRAM(s) Oral <User Schedule>  QUEtiapine 50 milliGRAM(s) Oral at bedtime  sodium chloride 0.9%. 1000 milliLiter(s) (75 mL/Hr) IV Continuous <Continuous>    MEDICATIONS  (PRN):  acetaminophen   Tablet .. 650 milliGRAM(s) Oral every 6 hours PRN Temp greater or equal to 38.5C (101.3F), Mild Pain (1 - 3)  aluminum hydroxide/magnesium hydroxide/simethicone Suspension 30 milliLiter(s) Oral every 4 hours PRN Dyspepsia  haloperidol    Injectable 5 milliGRAM(s) IntraMuscular every 6 hours PRN Severe Agitation  LORazepam   Injectable 2 milliGRAM(s) IntraMuscular every 6 hours PRN severe anxiety/agitation  melatonin 3 milliGRAM(s) Oral at bedtime PRN Insomnia  metoclopramide 10 milliGRAM(s) Oral every 6 hours PRN gastroparesis  ondansetron Injectable 4 milliGRAM(s) IV Push every 8 hours PRN Nausea and/or Vomiting  QUEtiapine 25 milliGRAM(s) Oral every 6 hours PRN anxiety/agitation  
MEDICATIONS  (STANDING):  fluconAZOLE   Tablet 100 milliGRAM(s) Oral daily  heparin   Injectable 5000 Unit(s) SubCutaneous every 8 hours  influenza   Vaccine 0.5 milliLiter(s) IntraMuscular once  LORazepam   Injectable 2 milliGRAM(s) IntraMuscular once  mirtazapine 15 milliGRAM(s) Oral at bedtime  QUEtiapine 25 milliGRAM(s) Oral <User Schedule>  QUEtiapine 50 milliGRAM(s) Oral at bedtime    MEDICATIONS  (PRN):  acetaminophen   Tablet .. 650 milliGRAM(s) Oral every 6 hours PRN Temp greater or equal to 38.5C (101.3F), Mild Pain (1 - 3)  aluminum hydroxide/magnesium hydroxide/simethicone Suspension 30 milliLiter(s) Oral every 4 hours PRN Dyspepsia  haloperidol     Tablet 5 milliGRAM(s) Oral every 6 hours PRN SEVERE AGITATION  haloperidol    Injectable 5 milliGRAM(s) IntraMuscular every 6 hours PRN Severe Agitation  LORazepam     Tablet 2 milliGRAM(s) Oral every 6 hours PRN Severe agitation  LORazepam   Injectable 2 milliGRAM(s) IntraMuscular every 6 hours PRN severe anxiety/agitation  melatonin 3 milliGRAM(s) Oral at bedtime PRN Insomnia  metoclopramide 10 milliGRAM(s) Oral every 6 hours PRN gastroparesis  ondansetron Injectable 4 milliGRAM(s) IV Push every 8 hours PRN Nausea and/or Vomiting  QUEtiapine 25 milliGRAM(s) Oral every 6 hours PRN anxiety/agitation  
MEDICATIONS  (STANDING):  fluconAZOLE   Tablet 100 milliGRAM(s) Oral daily  heparin   Injectable 5000 Unit(s) SubCutaneous every 8 hours  influenza   Vaccine 0.5 milliLiter(s) IntraMuscular once  LORazepam   Injectable 2 milliGRAM(s) IntraMuscular once  mirtazapine 15 milliGRAM(s) Oral at bedtime  QUEtiapine 100 milliGRAM(s) Oral at bedtime  QUEtiapine 25 milliGRAM(s) Oral <User Schedule>    MEDICATIONS  (PRN):  acetaminophen   Tablet .. 650 milliGRAM(s) Oral every 6 hours PRN Temp greater or equal to 38.5C (101.3F), Mild Pain (1 - 3)  aluminum hydroxide/magnesium hydroxide/simethicone Suspension 30 milliLiter(s) Oral every 4 hours PRN Dyspepsia  haloperidol     Tablet 5 milliGRAM(s) Oral every 6 hours PRN SEVERE AGITATION  haloperidol    Injectable 5 milliGRAM(s) IntraMuscular every 6 hours PRN Severe Agitation  LORazepam     Tablet 2 milliGRAM(s) Oral every 6 hours PRN Severe agitation  LORazepam   Injectable 2 milliGRAM(s) IntraMuscular every 6 hours PRN severe anxiety/agitation  melatonin 3 milliGRAM(s) Oral at bedtime PRN Insomnia  metoclopramide 10 milliGRAM(s) Oral every 6 hours PRN gastroparesis  ondansetron Injectable 4 milliGRAM(s) IV Push every 8 hours PRN Nausea and/or Vomiting  QUEtiapine 25 milliGRAM(s) Oral every 6 hours PRN anxiety/agitation  
165.1
MEDICATIONS  (STANDING):  aspirin enteric coated 81 milliGRAM(s) Oral daily  enalapril 2.5 milliGRAM(s) Oral daily  fluconAZOLE   Tablet 100 milliGRAM(s) Oral daily  heparin   Injectable 5000 Unit(s) SubCutaneous every 8 hours  influenza   Vaccine 0.5 milliLiter(s) IntraMuscular once  LORazepam   Injectable 2 milliGRAM(s) IntraMuscular once  mirtazapine 15 milliGRAM(s) Oral at bedtime  QUEtiapine 100 milliGRAM(s) Oral at bedtime  QUEtiapine 25 milliGRAM(s) Oral <User Schedule>    MEDICATIONS  (PRN):  acetaminophen   Tablet .. 650 milliGRAM(s) Oral every 6 hours PRN Temp greater or equal to 38.5C (101.3F), Mild Pain (1 - 3)  aluminum hydroxide/magnesium hydroxide/simethicone Suspension 30 milliLiter(s) Oral every 4 hours PRN Dyspepsia  haloperidol     Tablet 5 milliGRAM(s) Oral every 6 hours PRN SEVERE AGITATION  haloperidol    Injectable 5 milliGRAM(s) IntraMuscular every 6 hours PRN Severe Agitation  LORazepam     Tablet 2 milliGRAM(s) Oral every 6 hours PRN Severe agitation  LORazepam   Injectable 2 milliGRAM(s) IntraMuscular every 6 hours PRN severe anxiety/agitation  melatonin 3 milliGRAM(s) Oral at bedtime PRN Insomnia  metoclopramide 10 milliGRAM(s) Oral every 6 hours PRN gastroparesis  ondansetron Injectable 4 milliGRAM(s) IV Push every 8 hours PRN Nausea and/or Vomiting  QUEtiapine 25 milliGRAM(s) Oral every 6 hours PRN anxiety/agitation

## 2021-09-21 NOTE — DISCHARGE NOTE PROVIDER - DETAILS OF MALNUTRITION DIAGNOSIS/DIAGNOSES
This patient has been assessed with a concern for Malnutrition and was treated during this hospitalization for the following Nutrition diagnosis/diagnoses:     -  09/19/2021: Severe protein-calorie malnutrition   -  09/19/2021: Underweight (BMI < 19)

## 2021-09-21 NOTE — BH CONSULTATION LIAISON PROGRESS NOTE - NSBHFUPMEDSE_PSY_A_CORE
Encounter Date: 2/24/2017    SCRIBE #1 NOTE: I, Emeka Story, am scribing for, and in the presence of,  Sujey Birch NP. I have scribed the following portions of the note - Other sections scribed: HPI, ROS.       History     Chief Complaint   Patient presents with    Headache     pt states headache and facial pain since this am      Review of patient's allergies indicates:  No Known Allergies  HPI Comments: CC: Nose Pain    HPI: This 45 year old female has a past medical history of Thyroid disease presents to the ED with a one day history of pressure and pain inside her left nose with associated swelling. Pt also complains of pain around her left eye. Pt denies to trauma to her head. Pt states she had a headache with associated fatigue and weakness when the nose pain began yesterday. Pt is being [facility] for psoriasis at [facility] but is unsure what medication she is on. Pt's last menstrual cycle was on 2/15/17. Pt denies nausea, photophobia, drainage from nose, fever, rhinorrhea, cough. Pt denies history of abscess. Pt denies allergies to medicine. No other symptoms reported.       The history is provided by the patient. A  was used.     Past Medical History:   Diagnosis Date    Thyroid disease      Past Surgical History:   Procedure Laterality Date    CHOLECYSTECTOMY      THYROIDECTOMY       History reviewed. No pertinent family history.  Social History   Substance Use Topics    Smoking status: Never Smoker    Smokeless tobacco: None    Alcohol use No     Review of Systems   Constitutional: Negative for fever.   HENT: Positive for facial swelling (left side around nose). Negative for rhinorrhea and sore throat.         (-) no drainage from left nostril  (+) pain around left eye  (+) pain and pressure in left sinus   Eyes: Negative for photophobia.   Respiratory: Negative for shortness of breath.    Cardiovascular: Negative for chest pain.   Gastrointestinal: Negative for nausea. 
  Genitourinary: Negative for dysuria.   Musculoskeletal: Negative for back pain.   Skin: Negative for rash.   Neurological: Negative for weakness.   Hematological: Does not bruise/bleed easily.       Physical Exam   Initial Vitals   BP Pulse Resp Temp SpO2   02/24/17 1728 02/24/17 1728 02/24/17 1728 02/24/17 1728 02/24/17 1728   128/88 88 18 98.4 °F (36.9 °C) 99 %     Physical Exam    Nursing note and vitals reviewed.  Constitutional: She appears well-developed and well-nourished.   HENT:   Head: Normocephalic.   Mouth/Throat: Oropharynx is clear and moist.   bilat nares clear.  No d/c.  Left side of nose into nasal/labial fold and inferior orbit with (+) swelling, tenderness, erythema and warmth.    Eyes: Conjunctivae and EOM are normal. Pupils are equal, round, and reactive to light.   No pain with EOM   Neck: Normal range of motion. Neck supple.   Cardiovascular: Normal rate, regular rhythm and normal heart sounds.   Pulmonary/Chest: Breath sounds normal. No respiratory distress.   Musculoskeletal: Normal range of motion.   Lymphadenopathy:     She has no cervical adenopathy.   Neurological: She is alert and oriented to person, place, and time.   Skin: Skin is warm and dry.   Psychiatric: She has a normal mood and affect.         ED Course   Procedures  Labs Reviewed   CBC W/ AUTO DIFFERENTIAL - Abnormal; Notable for the following:        Result Value    Platelets 362 (*)     MPV 9.1 (*)     All other components within normal limits   COMPREHENSIVE METABOLIC PANEL - Abnormal; Notable for the following:     Glucose 336 (*)     AST 9 (*)     All other components within normal limits   URINALYSIS - Abnormal; Notable for the following:     Appearance, UA Hazy (*)     Glucose, UA 3+ (*)     Occult Blood UA 1+ (*)     Leukocytes, UA 1+ (*)     All other components within normal limits   URINALYSIS MICROSCOPIC - Abnormal; Notable for the following:     WBC, UA 10 (*)     Yeast, UA Occasional (*)     All other 
components within normal limits   CULTURE, URINE   POCT URINE PREGNANCY             Medical Decision Making:   Initial Assessment:   45yr old female presents with 1 day of swelling, pain and pressure to left nose, cheek and inf orbit.   Differential Diagnosis:   Facial cellulitis  Orbital cellulitis  Facial abscess    ED Management:  Pts exam was positive for swelling, tenderness, warmth, and erythema over left side of nose, cheek and inf orbit.  pt does not appear ill or toxic, pt is afebrile with normal VS, no focal neurological deficits, heart and lung sounds normal.    Labs and CT were reviewed and discussed with pt. Pt states she has had recent vaginal itching.  No urinary s/s.     Based on exam today I feel pt is safe to be discharged home with cleocin and close follow up.  Jarrett used to discussed plan and precautions, when to rtn to er and the fact that pts glucose is elevated (she attributes this to the ? Psoriasis medication she is on). Pt is given PCP f/u information and instructed to call Monday for appointment and to rtn to er for fever, worsening pain, or redness.     Pt verbalizes understanding of d/c instructions and will return for worsening condition.    Case discussed with attending who agrees with assessment and plan.               Scribe Attestation:   Scribe #1: I performed the above scribed service and the documentation accurately describes the services I performed. I attest to the accuracy of the note.    Attending Attestation:     Physician Attestation Statement for NP/PA:   I discussed this assessment and plan of this patient with the NP/PA, but I did not personally examine the patient. The face to face encounter was performed by the NP/PA.    Other NP/PA Attestation Additions:      Medical Decision Makin-year-old female presenting with left-sided facial pain and redness.  Vital signs unremarkable.  Labs show no leukocytosis.  CT shows facial cellulitis without abscess.  I agree with 
plan.       Physician Attestation for Scribe:  Physician Attestation Statement for Scribe #1: I, Sujey Birch NP, reviewed documentation, as scribed by Emeka Story in my presence, and it is both accurate and complete.                 ED Course     Clinical Impression:   The primary encounter diagnosis was Facial cellulitis. Diagnoses of Hyperglycemia and Candidiasis of female genitalia were also pertinent to this visit.    Disposition:   Disposition: Discharged  Condition: Stable       Sujey Birch NP  02/24/17 2141       Tomas Franklin MD  02/24/17 2147    
None known

## 2021-09-21 NOTE — BH CONSULTATION LIAISON PROGRESS NOTE - NSBHMSESPEECH_PSY_A_CORE
Abnormal as indicated, otherwise normal...
Normal volume, rate, productivity, spontaneity and articulation
Abnormal as indicated, otherwise normal...
Abnormal as indicated, otherwise normal...

## 2021-09-21 NOTE — DISCHARGE NOTE PROVIDER - PROVIDER TOKENS
PROVIDER:[TOKEN:[[id number]]] PROVIDER:[TOKEN:[[id number]]],PROVIDER:[TOKEN:[[id number]]]

## 2021-09-22 ENCOUNTER — TRANSCRIPTION ENCOUNTER (OUTPATIENT)
Age: 56
End: 2021-09-22

## 2021-09-22 VITALS
DIASTOLIC BLOOD PRESSURE: 71 MMHG | SYSTOLIC BLOOD PRESSURE: 110 MMHG | HEART RATE: 109 BPM | OXYGEN SATURATION: 99 % | TEMPERATURE: 99 F

## 2021-09-22 PROCEDURE — 99232 SBSQ HOSP IP/OBS MODERATE 35: CPT

## 2021-09-22 PROCEDURE — 99239 HOSP IP/OBS DSCHRG MGMT >30: CPT

## 2021-09-22 RX ORDER — METOPROLOL TARTRATE 50 MG
12.5 TABLET ORAL DAILY
Refills: 0 | Status: DISCONTINUED | OUTPATIENT
Start: 2021-09-22 | End: 2021-09-22

## 2021-09-22 RX ORDER — METOPROLOL TARTRATE 50 MG
1 TABLET ORAL
Qty: 0 | Refills: 0 | DISCHARGE
Start: 2021-09-22 | End: 2021-10-21

## 2021-09-22 RX ORDER — ASPIRIN/CALCIUM CARB/MAGNESIUM 324 MG
1 TABLET ORAL
Qty: 0 | Refills: 0 | DISCHARGE
Start: 2021-09-22

## 2021-09-22 RX ORDER — QUETIAPINE FUMARATE 200 MG/1
1 TABLET, FILM COATED ORAL
Qty: 30 | Refills: 0
Start: 2021-09-22 | End: 2021-10-21

## 2021-09-22 RX ORDER — FLUCONAZOLE 150 MG/1
1 TABLET ORAL
Qty: 8 | Refills: 0
Start: 2021-09-22 | End: 2021-09-29

## 2021-09-22 RX ORDER — FLUCONAZOLE 150 MG/1
5 TABLET ORAL
Qty: 0 | Refills: 0 | DISCHARGE

## 2021-09-22 RX ORDER — MIRTAZAPINE 45 MG/1
1 TABLET, ORALLY DISINTEGRATING ORAL
Qty: 30 | Refills: 0
Start: 2021-09-22 | End: 2021-10-21

## 2021-09-22 RX ORDER — METOPROLOL TARTRATE 50 MG
0.5 TABLET ORAL
Qty: 15 | Refills: 0
Start: 2021-09-22 | End: 2021-10-21

## 2021-09-22 RX ADMIN — FLUCONAZOLE 100 MILLIGRAM(S): 150 TABLET ORAL at 11:48

## 2021-09-22 RX ADMIN — QUETIAPINE FUMARATE 25 MILLIGRAM(S): 200 TABLET, FILM COATED ORAL at 07:53

## 2021-09-22 RX ADMIN — HEPARIN SODIUM 5000 UNIT(S): 5000 INJECTION INTRAVENOUS; SUBCUTANEOUS at 07:10

## 2021-09-22 RX ADMIN — Medication 81 MILLIGRAM(S): at 11:48

## 2021-09-22 NOTE — PROGRESS NOTE ADULT - SUBJECTIVE AND OBJECTIVE BOX
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 2021 01:42)  Source: Clean Catch Clean Catch (Midstream)  Final Report (18 Sep 2021 22:36):    No growth        RADIOLOGY & ADDITIONAL TESTS:  Results Reviewed:   Imaging Personally Reviewed:  Electrocardiogram Personally Reviewed:    COORDINATION OF CARE:  Care Discussed with Consultants/Other Providers [Y/N]:  Prior or Outpatient Records Reviewed [Y/N]:

## 2021-09-22 NOTE — PROGRESS NOTE ADULT - PROBLEM SELECTOR PROBLEM 3
Chest pain
Need for prophylactic measure
Chronic combined systolic and diastolic heart failure
Chronic combined systolic and diastolic heart failure
Chest pain

## 2021-09-22 NOTE — PROGRESS NOTE ADULT - PROBLEM SELECTOR PLAN 5
heparin SQ 5000 TID
heparin SQ 5000 TID
Very agitated   expressing suicidal thoughts, will follow up psych recs
Very agitated   expressing suicidal thoughts, will follow up psych recs

## 2021-09-22 NOTE — PROGRESS NOTE ADULT - PROBLEM SELECTOR PROBLEM 2
Adult failure to thrive
Chest pain
Adult failure to thrive

## 2021-09-22 NOTE — PROGRESS NOTE ADULT - PROBLEM SELECTOR PLAN 3
trops negative  currently chest pain free  monitor vital signs q4hrs
Will consult cardiology and follow up recs. Considering risk factors and reported h/o substance use disorder, may need to refrain from initiating beta-blockers.
heparin SQ 5000 TID
Will consult cardiology and follow up recs. Considering risk factors and reported h/o substance use disorder, may need to refrain from initiating beta-blockers.
trops negative  currently chest pain free  monitor vital signs q4hrs

## 2021-09-22 NOTE — PROGRESS NOTE ADULT - NUTRITIONAL ASSESSMENT
This patient has been assessed with a concern for Malnutrition and has been determined to have a diagnosis/diagnoses of Severe protein-calorie malnutrition and Underweight (BMI < 19).    This patient is being managed with:   Diet DASH/TLC-  Sodium & Cholesterol Restricted  Tube Feeding Modality: Gastrostomy  TwoCal HN (TWOCALHNRTH)  Total Volume for 24 Hours (mL): 960  Bolus  Total Volume of Bolus (mL):  240  Tube Feed Frequency: Every 6 hours   Tube Feed Start Time: 16:15  Bolus Feed Rate (mL per Hour): 0   Bolus Feed Duration (in Hours): 24  Entered: Sep 19 2021  1:14PM    
This patient has been assessed with a concern for Malnutrition and has been determined to have a diagnosis/diagnoses of Severe protein-calorie malnutrition and Underweight (BMI < 19).    This patient is being managed with:   Diet DASH/TLC-  Sodium & Cholesterol Restricted  Tube Feeding Modality: Gastrostomy  TwoCal HN (TWOCALHNRTH)  Total Volume for 24 Hours (mL): 960  Bolus  Total Volume of Bolus (mL):  240  Tube Feed Frequency: Every 6 hours   Tube Feed Start Time: 16:15  Bolus Feed Rate (mL per Hour): 0   Bolus Feed Duration (in Hours): 24  Entered: Sep 19 2021  1:14PM    
This patient has been assessed with a concern for Malnutrition and has been determined to have a diagnosis/diagnoses of Severe protein-calorie malnutrition and Underweight (BMI < 19).    This patient is being managed with:   Diet DASH/TLC-  Sodium & Cholesterol Restricted  Tube Feeding Modality: Gastrostomy  TwoCal HN (TWOCALHNRTH)  Total Volume for 24 Hours (mL): 960  Bolus  Total Volume of Bolus (mL):  240  Tube Feed Frequency: Every 6 hours   Tube Feed Start Time: 16:15  Bolus Feed Rate (mL per Hour): 0   Bolus Feed Duration (in Hours): 24  Supplement Feeding Modality:  Oral  Ensure Enlive Cans or Servings Per Day:  1       Frequency:  Daily  Entered: Sep 21 2021  3:53PM

## 2021-09-22 NOTE — DISCHARGE NOTE NURSING/CASE MANAGEMENT/SOCIAL WORK - PATIENT PORTAL LINK FT
You can access the FollowMyHealth Patient Portal offered by [facility] by registering at the following website: [email or web address]. By joining [employer]s FollowMyHealth portal, you will also be able to view your health information using other applications (apps) compatible with our system.

## 2021-09-22 NOTE — PROGRESS NOTE ADULT - PROBLEM SELECTOR PLAN 4
heparin SQ 5000 TID
Very agitated   expressing suicidal thoughts, will follow up psych recs
trops negative  currently chest pain free  Echo report with diastolic/systolic dysfunction- appears to have #chronic combined systolic and diastolic HF
trops negative  currently chest pain free  Echo report with diastolic/systolic dysfunction- appears to have #chronic combined systolic and diastolic HF  Cards consulted, recs noted.
heparin SQ 5000 TID

## 2021-09-22 NOTE — PROGRESS NOTE ADULT - PROBLEM SELECTOR PROBLEM 1
Perez Agitation Sedation Scale (RASS) score plus 4, combative
Perez Agitation Sedation Scale (RASS) score plus 4, combative
Adult failure to thrive
Perez Agitation Sedation Scale (RASS) score plus 4, combative
Perez Agitation Sedation Scale (RASS) score plus 4, combative

## 2021-09-22 NOTE — PROGRESS NOTE ADULT - PROBLEM SELECTOR PLAN 2
Failure to thrive in setting of Squamous cell CA of right tonsil status post tonsillectomy, chemotherapy, and radiation therapy 3 months ago  will try to initiate medical eval, however this could be related to an underlying psychiatric condition  If patient allow, will consider Cineesophagogram  As per onc, can follow up as outpatient, currently TERRY
Failure to thrive in setting of Squamous cell CA of right tonsil status post tonsillectomy, chemotherapy, and radiation therapy 3 months ago  will try to initiate medical eval, however this could be related to an underlying psychiatric condition  If patient allow, will consider Cineesophagogram- completed will follow up results  As per onc, can follow up as outpatient, currently TERRY
trops negative  currently chest pain free  monitor vital signs q4hrs
Failure to thrive in setting of Squamous cell CA of right tonsil status post tonsillectomy, chemotherapy, and radiation therapy 3 months ago  will try to initiate medical eval, however this could be related to an underlying psychiatric condition  If patient allow, will consider Cineesophagogram- completed will follow up results  As per onc, can follow up as outpatient, currently TERRY
Failure to thrive in setting of Squamous cell CA of right tonsil status post tonsillectomy, chemotherapy, and radiation therapy 3 months ago  will try to initiate medical eval, however this could be related to an underlying psychiatric condition  If patient allow, will consider Cineesophagogram- Recs noted, no further changes made  As per onc, can follow up as outpatient, currently TERRY
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
## 2021-09-22 NOTE — PROGRESS NOTE ADULT - PROBLEM SELECTOR PLAN 1
Failure to thrive in setting of Squamous cell CA of right tonsil status post tonsillectomy, chemotherapy, and radiation therapy 3 months ago  will try to initiate medical eval, however this could be related to an underlying psychiatric condition  If patient allow, will consider Cineesophagogram  As per onc, can follow up as outpatient, currently TERRY
Very agitated, combative  Overnight required PRNs  Will follow up with C&L today for further recs. Discussed with Dr. Argueta.    Ordered delirium labs and CT of the head  Patient unlikely to allow for further blood draws at this time
Very agitated, combative  Overnight required PRNs  Will follow up with C&L today for further recs. Discussed with Dr. Fritz     Ordered delirium labs -Patient unlikely to allow for further blood draws at this time  CT head unremarkable
Resolved  Ordered delirium labs -Patient unlikely to allow for further blood draws at this time  CT head unremarkable    Discussed with psychiatrist Dr. Argueta, patient can be discharged. As per behavioural health team, patient will not benefit from inpatient psychiatric hospitalization and would be at risk for further decompensation. At the time of discharge the patient denied any suicidal ideations or plans.
Very agitated, combative  Overnight required PRNs  Will follow up with C&L today for further recs

## 2021-09-22 NOTE — PROGRESS NOTE ADULT - REASON FOR ADMISSION
Failure to thrive

## 2021-09-30 ENCOUNTER — NON-APPOINTMENT (OUTPATIENT)
Age: 56
End: 2021-09-30

## 2021-10-01 PROBLEM — C76.0 MALIGNANT NEOPLASM OF HEAD, FACE AND NECK: Chronic | Status: ACTIVE | Noted: 2021-04-15

## 2021-10-04 DIAGNOSIS — Z71.89 OTHER SPECIFIED COUNSELING: ICD-10-CM

## 2021-10-07 ENCOUNTER — NON-APPOINTMENT (OUTPATIENT)
Age: 56
End: 2021-10-07

## 2021-10-12 ENCOUNTER — APPOINTMENT (OUTPATIENT)
Dept: HEPATOLOGY | Facility: CLINIC | Age: 56
End: 2021-10-12

## 2021-10-14 ENCOUNTER — NON-APPOINTMENT (OUTPATIENT)
Age: 56
End: 2021-10-14

## 2021-12-01 PROCEDURE — G9005: CPT

## 2021-12-09 ENCOUNTER — APPOINTMENT (OUTPATIENT)
Dept: RADIATION ONCOLOGY | Facility: CLINIC | Age: 56
End: 2021-12-09
Payer: MEDICAID

## 2021-12-09 ENCOUNTER — INPATIENT (INPATIENT)
Facility: HOSPITAL | Age: 56
LOS: 6 days | Discharge: HOME HEALTH | DRG: 640 | End: 2021-12-16
Attending: INTERNAL MEDICINE | Admitting: INTERNAL MEDICINE
Payer: MEDICAID

## 2021-12-09 VITALS
HEART RATE: 66 BPM | HEIGHT: 73 IN | OXYGEN SATURATION: 99 % | DIASTOLIC BLOOD PRESSURE: 73 MMHG | SYSTOLIC BLOOD PRESSURE: 110 MMHG | WEIGHT: 125 LBS | TEMPERATURE: 98 F | RESPIRATION RATE: 18 BRPM

## 2021-12-09 VITALS
DIASTOLIC BLOOD PRESSURE: 72 MMHG | HEART RATE: 79 BPM | WEIGHT: 125.99 LBS | BODY MASS INDEX: 17.1 KG/M2 | OXYGEN SATURATION: 98 % | SYSTOLIC BLOOD PRESSURE: 107 MMHG | RESPIRATION RATE: 16 BRPM

## 2021-12-09 DIAGNOSIS — Z98.89 OTHER SPECIFIED POSTPROCEDURAL STATES: Chronic | ICD-10-CM

## 2021-12-09 DIAGNOSIS — R62.7 ADULT FAILURE TO THRIVE: ICD-10-CM

## 2021-12-09 DIAGNOSIS — Z98.890 OTHER SPECIFIED POSTPROCEDURAL STATES: Chronic | ICD-10-CM

## 2021-12-09 LAB
ALBUMIN SERPL ELPH-MCNC: 3.9 G/DL — SIGNIFICANT CHANGE UP (ref 3.3–5)
ALP SERPL-CCNC: 51 U/L — SIGNIFICANT CHANGE UP (ref 40–120)
ALT FLD-CCNC: 23 U/L — SIGNIFICANT CHANGE UP (ref 10–45)
ANION GAP SERPL CALC-SCNC: 11 MMOL/L — SIGNIFICANT CHANGE UP (ref 5–17)
APPEARANCE UR: CLEAR — SIGNIFICANT CHANGE UP
AST SERPL-CCNC: 17 U/L — SIGNIFICANT CHANGE UP (ref 10–40)
BACTERIA # UR AUTO: NEGATIVE — SIGNIFICANT CHANGE UP
BASE EXCESS BLDV CALC-SCNC: 4 MMOL/L — HIGH (ref -2–2)
BASOPHILS # BLD AUTO: 0.04 K/UL — SIGNIFICANT CHANGE UP (ref 0–0.2)
BASOPHILS NFR BLD AUTO: 0.8 % — SIGNIFICANT CHANGE UP (ref 0–2)
BILIRUB SERPL-MCNC: 0.3 MG/DL — SIGNIFICANT CHANGE UP (ref 0.2–1.2)
BILIRUB UR-MCNC: NEGATIVE — SIGNIFICANT CHANGE UP
BUN SERPL-MCNC: 35 MG/DL — HIGH (ref 7–23)
CA-I SERPL-SCNC: 1.26 MMOL/L — SIGNIFICANT CHANGE UP (ref 1.15–1.33)
CALCIUM SERPL-MCNC: 9.1 MG/DL — SIGNIFICANT CHANGE UP (ref 8.4–10.5)
CHLORIDE BLDV-SCNC: 101 MMOL/L — SIGNIFICANT CHANGE UP (ref 96–108)
CHLORIDE SERPL-SCNC: 101 MMOL/L — SIGNIFICANT CHANGE UP (ref 96–108)
CO2 BLDV-SCNC: 31 MMOL/L — HIGH (ref 22–26)
CO2 SERPL-SCNC: 26 MMOL/L — SIGNIFICANT CHANGE UP (ref 22–31)
COLOR SPEC: COLORLESS — SIGNIFICANT CHANGE UP
CREAT SERPL-MCNC: 1.39 MG/DL — HIGH (ref 0.5–1.3)
DIFF PNL FLD: NEGATIVE — SIGNIFICANT CHANGE UP
EOSINOPHIL # BLD AUTO: 0.13 K/UL — SIGNIFICANT CHANGE UP (ref 0–0.5)
EOSINOPHIL NFR BLD AUTO: 2.7 % — SIGNIFICANT CHANGE UP (ref 0–6)
EPI CELLS # UR: 0 /HPF — SIGNIFICANT CHANGE UP
GAS PNL BLDV: 133 MMOL/L — LOW (ref 136–145)
GAS PNL BLDV: SIGNIFICANT CHANGE UP
GLUCOSE BLDV-MCNC: 79 MG/DL — SIGNIFICANT CHANGE UP (ref 70–99)
GLUCOSE SERPL-MCNC: 83 MG/DL — SIGNIFICANT CHANGE UP (ref 70–99)
GLUCOSE UR QL: NEGATIVE — SIGNIFICANT CHANGE UP
HCO3 BLDV-SCNC: 30 MMOL/L — HIGH (ref 22–29)
HCT VFR BLD CALC: 30.2 % — LOW (ref 39–50)
HCT VFR BLDA CALC: 31 % — LOW (ref 39–51)
HGB BLD CALC-MCNC: 10.3 G/DL — LOW (ref 12.6–17.4)
HGB BLD-MCNC: 10 G/DL — LOW (ref 13–17)
HYALINE CASTS # UR AUTO: 0 /LPF — SIGNIFICANT CHANGE UP (ref 0–2)
IMM GRANULOCYTES NFR BLD AUTO: 0.2 % — SIGNIFICANT CHANGE UP (ref 0–1.5)
KETONES UR-MCNC: NEGATIVE — SIGNIFICANT CHANGE UP
LACTATE BLDV-MCNC: 1.1 MMOL/L — SIGNIFICANT CHANGE UP (ref 0.7–2)
LEUKOCYTE ESTERASE UR-ACNC: NEGATIVE — SIGNIFICANT CHANGE UP
LYMPHOCYTES # BLD AUTO: 0.61 K/UL — LOW (ref 1–3.3)
LYMPHOCYTES # BLD AUTO: 12.5 % — LOW (ref 13–44)
MAGNESIUM SERPL-MCNC: 2.2 MG/DL — SIGNIFICANT CHANGE UP (ref 1.6–2.6)
MCHC RBC-ENTMCNC: 30 PG — SIGNIFICANT CHANGE UP (ref 27–34)
MCHC RBC-ENTMCNC: 33.1 GM/DL — SIGNIFICANT CHANGE UP (ref 32–36)
MCV RBC AUTO: 90.7 FL — SIGNIFICANT CHANGE UP (ref 80–100)
MONOCYTES # BLD AUTO: 0.72 K/UL — SIGNIFICANT CHANGE UP (ref 0–0.9)
MONOCYTES NFR BLD AUTO: 14.7 % — HIGH (ref 2–14)
NEUTROPHILS # BLD AUTO: 3.38 K/UL — SIGNIFICANT CHANGE UP (ref 1.8–7.4)
NEUTROPHILS NFR BLD AUTO: 69.1 % — SIGNIFICANT CHANGE UP (ref 43–77)
NITRITE UR-MCNC: NEGATIVE — SIGNIFICANT CHANGE UP
NRBC # BLD: 0 /100 WBCS — SIGNIFICANT CHANGE UP (ref 0–0)
PCO2 BLDV: 49 MMHG — SIGNIFICANT CHANGE UP (ref 42–55)
PH BLDV: 7.39 — SIGNIFICANT CHANGE UP (ref 7.32–7.43)
PH UR: 7 — SIGNIFICANT CHANGE UP (ref 5–8)
PHOSPHATE SERPL-MCNC: 3.2 MG/DL — SIGNIFICANT CHANGE UP (ref 2.5–4.5)
PLATELET # BLD AUTO: 228 K/UL — SIGNIFICANT CHANGE UP (ref 150–400)
PO2 BLDV: 24 MMHG — LOW (ref 25–45)
POTASSIUM BLDV-SCNC: 4.6 MMOL/L — SIGNIFICANT CHANGE UP (ref 3.5–5.1)
POTASSIUM SERPL-MCNC: 4.6 MMOL/L — SIGNIFICANT CHANGE UP (ref 3.5–5.3)
POTASSIUM SERPL-SCNC: 4.6 MMOL/L — SIGNIFICANT CHANGE UP (ref 3.5–5.3)
PROT SERPL-MCNC: 6 G/DL — SIGNIFICANT CHANGE UP (ref 6–8.3)
PROT UR-MCNC: NEGATIVE — SIGNIFICANT CHANGE UP
RBC # BLD: 3.33 M/UL — LOW (ref 4.2–5.8)
RBC # FLD: 15.6 % — HIGH (ref 10.3–14.5)
RBC CASTS # UR COMP ASSIST: 0 /HPF — SIGNIFICANT CHANGE UP (ref 0–4)
SAO2 % BLDV: 40.6 % — LOW (ref 67–88)
SARS-COV-2 RNA SPEC QL NAA+PROBE: SIGNIFICANT CHANGE UP
SODIUM SERPL-SCNC: 138 MMOL/L — SIGNIFICANT CHANGE UP (ref 135–145)
SP GR SPEC: 1.01 — LOW (ref 1.01–1.02)
UROBILINOGEN FLD QL: NEGATIVE — SIGNIFICANT CHANGE UP
WBC # BLD: 4.89 K/UL — SIGNIFICANT CHANGE UP (ref 3.8–10.5)
WBC # FLD AUTO: 4.89 K/UL — SIGNIFICANT CHANGE UP (ref 3.8–10.5)
WBC UR QL: 0 /HPF — SIGNIFICANT CHANGE UP (ref 0–5)

## 2021-12-09 PROCEDURE — 71045 X-RAY EXAM CHEST 1 VIEW: CPT | Mod: 26

## 2021-12-09 PROCEDURE — 99285 EMERGENCY DEPT VISIT HI MDM: CPT

## 2021-12-09 PROCEDURE — 99213 OFFICE O/P EST LOW 20 MIN: CPT

## 2021-12-09 RX ORDER — ONDANSETRON 8 MG/1
4 TABLET, FILM COATED ORAL EVERY 8 HOURS
Refills: 0 | Status: DISCONTINUED | OUTPATIENT
Start: 2021-12-09 | End: 2021-12-14

## 2021-12-09 RX ORDER — SODIUM CHLORIDE 9 MG/ML
1000 INJECTION, SOLUTION INTRAVENOUS
Refills: 0 | Status: DISCONTINUED | OUTPATIENT
Start: 2021-12-09 | End: 2021-12-16

## 2021-12-09 RX ORDER — LANOLIN ALCOHOL/MO/W.PET/CERES
5 CREAM (GRAM) TOPICAL ONCE
Refills: 0 | Status: COMPLETED | OUTPATIENT
Start: 2021-12-09 | End: 2021-12-09

## 2021-12-09 RX ORDER — INFLUENZA VIRUS VACCINE 15; 15; 15; 15 UG/.5ML; UG/.5ML; UG/.5ML; UG/.5ML
0.5 SUSPENSION INTRAMUSCULAR ONCE
Refills: 0 | Status: DISCONTINUED | OUTPATIENT
Start: 2021-12-09 | End: 2021-12-16

## 2021-12-09 RX ORDER — METOCLOPRAMIDE HCL 10 MG
1 TABLET ORAL
Qty: 0 | Refills: 0 | DISCHARGE

## 2021-12-09 RX ORDER — METOPROLOL TARTRATE 50 MG
25 TABLET ORAL DAILY
Refills: 0 | Status: DISCONTINUED | OUTPATIENT
Start: 2021-12-09 | End: 2021-12-16

## 2021-12-09 RX ORDER — QUETIAPINE FUMARATE 200 MG/1
25 TABLET, FILM COATED ORAL DAILY
Refills: 0 | Status: DISCONTINUED | OUTPATIENT
Start: 2021-12-09 | End: 2021-12-16

## 2021-12-09 RX ORDER — SODIUM CHLORIDE 9 MG/ML
1000 INJECTION INTRAMUSCULAR; INTRAVENOUS; SUBCUTANEOUS ONCE
Refills: 0 | Status: COMPLETED | OUTPATIENT
Start: 2021-12-09 | End: 2021-12-09

## 2021-12-09 RX ORDER — FUROSEMIDE 40 MG
40 TABLET ORAL DAILY
Refills: 0 | Status: DISCONTINUED | OUTPATIENT
Start: 2021-12-09 | End: 2021-12-09

## 2021-12-09 RX ORDER — HEPARIN SODIUM 5000 [USP'U]/ML
5000 INJECTION INTRAVENOUS; SUBCUTANEOUS EVERY 8 HOURS
Refills: 0 | Status: DISCONTINUED | OUTPATIENT
Start: 2021-12-09 | End: 2021-12-16

## 2021-12-09 RX ORDER — MIRTAZAPINE 45 MG/1
15 TABLET, ORALLY DISINTEGRATING ORAL AT BEDTIME
Refills: 0 | Status: DISCONTINUED | OUTPATIENT
Start: 2021-12-09 | End: 2021-12-16

## 2021-12-09 RX ORDER — ASPIRIN/CALCIUM CARB/MAGNESIUM 324 MG
81 TABLET ORAL DAILY
Refills: 0 | Status: DISCONTINUED | OUTPATIENT
Start: 2021-12-09 | End: 2021-12-16

## 2021-12-09 RX ADMIN — SODIUM CHLORIDE 50 MILLILITER(S): 9 INJECTION, SOLUTION INTRAVENOUS at 21:29

## 2021-12-09 RX ADMIN — MIRTAZAPINE 15 MILLIGRAM(S): 45 TABLET, ORALLY DISINTEGRATING ORAL at 22:48

## 2021-12-09 RX ADMIN — Medication 5 MILLIGRAM(S): at 22:48

## 2021-12-09 RX ADMIN — HEPARIN SODIUM 5000 UNIT(S): 5000 INJECTION INTRAVENOUS; SUBCUTANEOUS at 22:48

## 2021-12-09 RX ADMIN — SODIUM CHLORIDE 1000 MILLILITER(S): 9 INJECTION INTRAMUSCULAR; INTRAVENOUS; SUBCUTANEOUS at 12:38

## 2021-12-09 NOTE — H&P ADULT - HISTORY OF PRESENT ILLNESS
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    Patient states that for past several months patient has been unable to eat any foods. Wife has been giving him meals through his feeding tube but he continues to loose weight, feeling very weak and fatigued.    Patient endorses loss of weight. However he is able to drink liquids.   Patient also complains of feeling dizzy when he tries to stand up too fast. Denies any recent illness, headache, fever, chills, chest pain, difficulty breathing, abdominal pain, n/v/d. 55 y/o male with pmhx squamous cell carcinoma of tonsills s/p chemo and radiation sent in by pmd  for decreased appetite weight.    Patient states that for past several months patient has been unable to eat any foods. Wife has been giving him meals through his feeding tube but he continues to loose weight, feeling very weak and fatigued.    Patient endorses loss of weight. However he is able to drink liquids.   Patient also complains of feeling dizzy when he tries to stand up too fast. Denies any recent illness, headache, fever, chills, chest pain, difficulty breathing, abdominal pain, n/v/d.    Last admission J for FTT, Major Depression with Agitation
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
## 2021-12-09 NOTE — VITALS
[Maximal Pain Intensity: 9/10] : 9/10 [Least Pain Intensity: 8/10] : 8/10 [Pain Description/Quality: ___] : Pain description/quality: [unfilled] [Pain Duration: ___] : Pain duration: [unfilled] [Pain Location: ___] : Pain Location: [unfilled] [Pain Interferes with ADLs] : Pain interferes with activities of daily living. [Opioid] : opioid [70: Cares for self; unalbe to carry on normal activity or do active work.] : 70: Cares for self; unable to carry on normal activity or do active work. [ECOG Performance Status: 2 - Ambulatory and capable of all self care but unable to carry out any work activities] : Performance Status: 2 - Ambulatory and capable of all self care but unable to carry out any work activities. Up and about more than 50% of waking hours

## 2021-12-09 NOTE — ED ADULT NURSE NOTE - OBJECTIVE STATEMENT
Male 56 years old with past medical history of throat cancer finished chemo and radiation, was brought in by EMS from [facility] for failure to thrive. As per wife pt unable to eat for several months, has lost 60 lbs for few months. Has peg tube receiving feeding but still losing weight. Lately patient is feeling weak and fatigued. Feeling dizzy when he gets up. Can't go to work anymore because he has no energy. Denies chest pain, sob, fever, chills or cough. Will continue to reassess.

## 2021-12-09 NOTE — ED ADULT TRIAGE NOTE - CHIEF COMPLAINT QUOTE
HX Throat CA TX Radiation Failure to Thrive Decreased appetite   Loss of weight 60lbs  Peg Tube HX Throat CA TX Radiation , completed x 6 months, Failure to Thrive Decreased appetite   Loss of weight 60lbs  Peg Tube

## 2021-12-09 NOTE — ED PROVIDER NOTE - CLINICAL SUMMARY MEDICAL DECISION MAKING FREE TEXT BOX
[FreeTextEntry1] : -PT AWARE THAT DOSE REDUCTION MAY HELP HER TO PREVENT FUTURE EPISODES OF WITHDRAWAL\par -pt is due to see high risk OBGYN
Home: 56 year old male with pmxh of scc of tonsils s/p chemo/radiation here with FTT. patient not able to eat any solids for past few months. has been taking through feeding tube but still lost 50-60 lbs in the past few months.  feeling weak and fatigued.  decreased energy. will get labs, ekg, cxr, will need to be admitted, reassess
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
## 2021-12-09 NOTE — H&P ADULT - HEART RATE (BEATS/MIN)
Initial SW/CM Assessment/Plan of Care Note     Baseline Assessment  73 year old admitted 4/24/2019 as Observation with a diagnosis of generalized weakness; and hypervolemia, unspecified hypervolemia type. Prior to admission patient was living with Adult children and residing at House.  Patient does not  have a Power of  for Healthcare. Patient’s Primary Care Provider is Gorge Cohen DO.     Medical History  Past Medical History:   Diagnosis Date   • A-fib (CMS/[facility])    • Diabetes mellitus (CMS/[facility])     type 2   • Essential (primary) hypertension    • Malignant neoplasm (CMS/[facility])    • Pneumonia        Prior to Admission Status  Functional Status  Ambulation: Walker  Bathing: Independent/Self  Dressing: Independent/Self  Toileting: Independent/Self  Meal Preparation: Independent/Self  Shopping: Family  Medication Preparation: Independent/Self  Medication Administration: Independent/Self  Transportation: Family    Agency/Support  Type of Services Prior to Hospitalization: Social Work, OT, PT  Support Systems: Family members  Home Devices/Equipment: Shower chair, Mobility assist device  Mobility Assist Devices: Four wheel walker  Sensory Support Devices: Eyeglasses, Dentures    Current Status  PT Ambulation Tips:    PT Transfer Tips:    OT Bathing Tips:    OT Dressing Tips:    OT Toileting Tips:    OT Feeding Tips:    SLP Swallow/Feeding Tips:    SLP Comm/Cog Tips:    Current Mental Status: Cooperative  Stressors:      Insurance  Primary: MEDICARE  Secondary: AARP    Barriers to Discharge  Identified Barriers to Discharge/Transition Planning: Unresolved medical issues    Progress Note  Received MD consult for d/c planning, Chart reviewed; pt admitted with generalized weakness; and hypervolemia, unspecified hypervolemia type. Consult to nephrology. PT/OT recommending rehab.     Met with pt and introduced self and SW role; pt appeared A/Ox4. Pt agreeable with SW visit; pt was receiving dialysis. Pt lives in a  house with adult son and dtr-in-law. Baseline, pt independent with ADLs with use of ww for mobility. Pt does not drive, family provides transportation. Pt denied medical devices.     Pt receives OP dialysis at [facility] (P-[phone] / Fax: [phone]) MWF chair time 15:00-19:00. Pt's sons and dtr-in-law provide transportation. Pt said she has completed application for [employer] w/c van service; pt just needs to get picture taken for ID before she can use w/c van services.    Writer discussed d/c plan with pt; aware PT/OT recommending rehab. Writer informed pt of observation status and medicare 3-midnight rule; writer told pt she can still go to rehab but will have to pay privately. Writer gave pt [facility] list and encouraged her to review list. Writer told pt if she does not want to pay for rehab alternative is going home with home care and hiring home care services to assist her 24/7. Pt said she has to discuss d/c plan with son. Writer offered to contact son but pt said she will contact him herself.     Writer confirmed with [facility] pt receives dialysis there. Facility requesting d/c summary when pt d/c.    SW will continue to follow.    Plan  SW/CM - Recommendations for Discharge:    PT - Recommendations for Discharge: Less intensive rehab  OT - Recommendations for Discharge: Less intensive rehab  SLP - Recommendations for Discharge:    Anticipate patient will need post-hospital services. Necessary services are available.  Anticipate patient cannot return to the environment from which patient entered the hospital.   Anticipate patient cannot provide self-care at discharge.    Refer to SW/CM Flowsheet for Goals and objective data.      77

## 2021-12-09 NOTE — PATIENT PROFILE ADULT - FALL HARM RISK - HARM RISK INTERVENTIONS
Assistance with ambulation/Assistance OOB with selected safe patient handling equipment/Communicate Risk of Fall with Harm to all staff/Monitor gait and stability/Reinforce activity limits and safety measures with patient and family/Sit up slowly, dangle for a short time, stand at bedside before walking/Tailored Fall Risk Interventions/Visual Cue: Yellow wristband and red socks/Bed in lowest position, wheels locked, appropriate side rails in place/Call bell, personal items and telephone in reach/Instruct patient to call for assistance before getting out of bed or chair/Non-slip footwear when patient is out of bed/[place] to call system/Physically safe environment - no spills, clutter or unnecessary equipment/Purposeful Proactive Rounding/Room/bathroom lighting operational, light cord in reach

## 2021-12-09 NOTE — H&P ADULT - ASSESSMENT
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 Moser for failure to thrive.       # FTT Encourage po diet.   Speech swallow eval.   Thick liquids as suggested by   Nutrition consult.   Resume PEG feeds am tomorrow.   Keep patient on full liquids.     GI eval ?Scope     # Squamous Cell Ca Tonsils s/p chemo Radiation- Out patient follow up    55 y/o male with PMHx of squamous cell carcinoma of tonsills s/p chemo and radiation presents to the ED sent in by Dr. Msoer for failure to thrive.       # FTT Encourage po diet.   Speech swallow eval.   Thick liquids as suggested by   Nutrition consult.   Resume PEG feeds am tomorrow.   Keep patient on full liquids.     GI eval ?Scope     # Squamous Cell Ca Tonsils s/p chemo Radiation- Out patient follow up     # Chronic Systolic dysfunction last admission diagnosed- Patient euvolemic. Hold Lasix .   Cards follow up.

## 2021-12-09 NOTE — ED PROVIDER NOTE - PHYSICAL EXAMINATION
CONSTITUTIONAL: Patient is awake, alert and oriented x 3. Patient is chronically ill appearing; cachetic   HEAD: NCAT  ENT: Airway patent, Nasal mucosa clear; poor dentition   NECK: Supple,   LUNGS: CTA B/L, no wheezes, rhonci or rales  HEART: RRR.+S1S2 no murmurs,   ABDOMEN: Soft, non-tender to palpation throughout all four quadrants, non-distended,   EXTREMITY: No edema or calf tenderness b/l, FROM upper and lower ext b/l,   SKIN: No rash or lesions  NEURO:  No focal deficits, Strength5/5 UE and LE b/l; Sensation intact;
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
## 2021-12-09 NOTE — HISTORY OF PRESENT ILLNESS
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 \par Lymph node, right level 2 biopsy positive for squamous cell carcinoma on March 2 2021; reportedly HPV/P16 negative. \par Completed RT to oropharynx/neck 6/4/2021 total dose 70 Gy. \par \par 9/13/2021 PET/CT: IMPRESSION: Compared to FDG-PET/CT dated 3/9/2021:\par \par 1. Resolution of hypermetabolism associated with right level 2 cervical lymphadenopathy which is decreased in size, compatible with response to interval therapy.\par \par 2. No evidence of FDG-avid disease. No new lesion.\par \par Presents today for follow up.

## 2021-12-09 NOTE — REVIEW OF SYSTEMS
[Dysphagia: Grade 2 - Symptomatic and altered eating/swallowing] : Dysphagia: Grade 2 - Symptomatic and altered eating/swallowing [Fatigue: Grade 1 - Fatigue relieved by rest] : Fatigue: Grade 1 - Fatigue relieved by rest [Mucositis Oral: Grade 0] : Mucositis Oral: Grade 0  [Xerostomia: Grade 1 - Symptomatic (e.g., dry or thick saliva) without significant dietary alteration; unstimulated saliva flow >0.2 ml/min] : Xerostomia: Grade 1 - Symptomatic (e.g., dry or thick saliva) without significant dietary alteration; unstimulated saliva flow >0.2 ml/min [Oral Pain: Grade 0] : Oral Pain: Grade 0 [Dysgeusia: Grade 1- Altered taste but no change in diet] : Dysgeusia: Grade 1 - Altered taste but no change in diet [Skin Hyperpigmentation: Grade 1 - Hyperpigmentation covering <10% BSA; no psychosocial impact] : Skin Hyperpigmentation: Grade 1 - Hyperpigmentation covering <10% BSA; no psychosocial impact [Dermatitis Radiation: Grade 0] : Dermatitis Radiation: Grade 0

## 2021-12-09 NOTE — ED PROVIDER NOTE - OBJECTIVE STATEMENT
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 Moser for failure to thrive. Patient states that he had a negative PET scan in September. For past several months patient has been unable to eat any foods. Wife has been giving him meals through his feeding tube but he continues to loose weight. Patient has lost about 50-60 lbs in past few months. Lately patient is feeling very weak and fatigued. He is unable to complete his usual tasks at home. He cannot go to work anymore because he does not have the energy. States that whenever he tries to eat any solid foods they get "stuck in his throat". However he is able to drink liquids. Patient also complains of feeling dizzy when he tries to stand up too fast. Denies any recent illness, headache, fever, chills, chest pain, difficulty breathing, abdominal pain, n/v/d.

## 2021-12-09 NOTE — ED ADULT NURSE NOTE - NSIMPLEMENTINTERV_GEN_ALL_ED
Implemented All Fall with Harm Risk Interventions:  [place] to call system. Call bell, personal items and telephone within reach. Instruct patient to call for assistance. Room bathroom lighting operational. Non-slip footwear when patient is off stretcher. Physically safe environment: no spills, clutter or unnecessary equipment. Stretcher in lowest position, wheels locked, appropriate side rails in place. Provide visual cue, wrist band, yellow gown, etc. Monitor gait and stability. Monitor for mental status changes and reorient to person, place, and time. Review medications for side effects contributing to fall risk. Reinforce activity limits and safety measures with patient and family. Provide visual clues: red socks.

## 2021-12-09 NOTE — ED ADULT NURSE NOTE - CHIEF COMPLAINT QUOTE
HX Throat CA TX Radiation , completed x 6 months, Failure to Thrive Decreased appetite   Loss of weight 60lbs  Peg Tube

## 2021-12-10 LAB
ANION GAP SERPL CALC-SCNC: 10 MMOL/L — SIGNIFICANT CHANGE UP (ref 5–17)
BUN SERPL-MCNC: 23 MG/DL — SIGNIFICANT CHANGE UP (ref 7–23)
CALCIUM SERPL-MCNC: 9.3 MG/DL — SIGNIFICANT CHANGE UP (ref 8.4–10.5)
CHLORIDE SERPL-SCNC: 108 MMOL/L — SIGNIFICANT CHANGE UP (ref 96–108)
CO2 SERPL-SCNC: 24 MMOL/L — SIGNIFICANT CHANGE UP (ref 22–31)
CREAT SERPL-MCNC: 1.24 MG/DL — SIGNIFICANT CHANGE UP (ref 0.5–1.3)
CULTURE RESULTS: NO GROWTH — SIGNIFICANT CHANGE UP
GLUCOSE SERPL-MCNC: 78 MG/DL — SIGNIFICANT CHANGE UP (ref 70–99)
HCT VFR BLD CALC: 31.6 % — LOW (ref 39–50)
HCV AB S/CO SERPL IA: 0.08 S/CO — SIGNIFICANT CHANGE UP (ref 0–0.99)
HCV AB SERPL-IMP: SIGNIFICANT CHANGE UP
HGB BLD-MCNC: 10.4 G/DL — LOW (ref 13–17)
MCHC RBC-ENTMCNC: 30.1 PG — SIGNIFICANT CHANGE UP (ref 27–34)
MCHC RBC-ENTMCNC: 32.9 GM/DL — SIGNIFICANT CHANGE UP (ref 32–36)
MCV RBC AUTO: 91.3 FL — SIGNIFICANT CHANGE UP (ref 80–100)
NRBC # BLD: 0 /100 WBCS — SIGNIFICANT CHANGE UP (ref 0–0)
PLATELET # BLD AUTO: 266 K/UL — SIGNIFICANT CHANGE UP (ref 150–400)
POTASSIUM SERPL-MCNC: 4.4 MMOL/L — SIGNIFICANT CHANGE UP (ref 3.5–5.3)
POTASSIUM SERPL-SCNC: 4.4 MMOL/L — SIGNIFICANT CHANGE UP (ref 3.5–5.3)
RBC # BLD: 3.46 M/UL — LOW (ref 4.2–5.8)
RBC # FLD: 15.8 % — HIGH (ref 10.3–14.5)
SODIUM SERPL-SCNC: 142 MMOL/L — SIGNIFICANT CHANGE UP (ref 135–145)
SPECIMEN SOURCE: SIGNIFICANT CHANGE UP
WBC # BLD: 2.71 K/UL — LOW (ref 3.8–10.5)
WBC # FLD AUTO: 2.71 K/UL — LOW (ref 3.8–10.5)

## 2021-12-10 PROCEDURE — 99222 1ST HOSP IP/OBS MODERATE 55: CPT | Mod: GC

## 2021-12-10 PROCEDURE — 70491 CT SOFT TISSUE NECK W/DYE: CPT | Mod: 26

## 2021-12-10 PROCEDURE — 99222 1ST HOSP IP/OBS MODERATE 55: CPT

## 2021-12-10 PROCEDURE — 71260 CT THORAX DX C+: CPT | Mod: 26

## 2021-12-10 RX ORDER — SALIVA SUBSTITUTE COMB NO.11 351 MG
5 POWDER IN PACKET (EA) MUCOUS MEMBRANE
Refills: 0 | Status: DISCONTINUED | OUTPATIENT
Start: 2021-12-10 | End: 2021-12-16

## 2021-12-10 RX ORDER — THIAMINE MONONITRATE (VIT B1) 100 MG
100 TABLET ORAL DAILY
Refills: 0 | Status: DISCONTINUED | OUTPATIENT
Start: 2021-12-10 | End: 2021-12-16

## 2021-12-10 RX ADMIN — Medication 81 MILLIGRAM(S): at 18:13

## 2021-12-10 RX ADMIN — HEPARIN SODIUM 5000 UNIT(S): 5000 INJECTION INTRAVENOUS; SUBCUTANEOUS at 21:33

## 2021-12-10 RX ADMIN — MIRTAZAPINE 15 MILLIGRAM(S): 45 TABLET, ORALLY DISINTEGRATING ORAL at 21:36

## 2021-12-10 RX ADMIN — Medication 25 MILLIGRAM(S): at 05:17

## 2021-12-10 RX ADMIN — QUETIAPINE FUMARATE 25 MILLIGRAM(S): 200 TABLET, FILM COATED ORAL at 18:13

## 2021-12-10 RX ADMIN — SODIUM CHLORIDE 50 MILLILITER(S): 9 INJECTION, SOLUTION INTRAVENOUS at 21:44

## 2021-12-10 RX ADMIN — Medication 5 MILLILITER(S): at 18:12

## 2021-12-10 NOTE — DIETITIAN INITIAL EVALUATION ADULT. - PERTINENT MEDS FT
MEDICATIONS  (STANDING):  aspirin enteric coated 81 milliGRAM(s) Oral daily  Biotene Dry Mouth Oral Rinse 5 milliLiter(s) Swish and Spit two times a day  dextrose 5% + sodium chloride 0.45%. 1000 milliLiter(s) (50 mL/Hr) IV Continuous <Continuous>  heparin   Injectable 5000 Unit(s) SubCutaneous every 8 hours  influenza   Vaccine 0.5 milliLiter(s) IntraMuscular once  metoprolol succinate ER 25 milliGRAM(s) Oral daily  mirtazapine 15 milliGRAM(s) Oral at bedtime  QUEtiapine 25 milliGRAM(s) Oral daily    MEDICATIONS  (PRN):  ondansetron Injectable 4 milliGRAM(s) IV Push every 8 hours PRN Nausea and/or Vomiting
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
## 2021-12-10 NOTE — SWALLOW BEDSIDE ASSESSMENT ADULT - SWALLOW EVAL: PATIENT/FAMILY GOALS STATEMENT
"Drinking is fine, no problems. But I can't swallow food. I have so much phlegm and my mouth is dry. It's hard to swallow it down. I've lost 80 lbs! I'm only 120 lbs now."

## 2021-12-10 NOTE — DIETITIAN INITIAL EVALUATION ADULT. - SIGNS/SYMPTOMS
s/p PEG, reduced PO intake x several months weight loss >20% in <1 year, muscle wasting and fat loss as above

## 2021-12-10 NOTE — BH CONSULTATION LIAISON ASSESSMENT NOTE - NSACTIVEVENT_PSY_ALL_CORE
Triggering events leading to humiliation, shame, and/or despair (e.g., Loss of relationship, financial or health status) (real or anticipated)/Current or pending social isolation/Recent onset of psychiatric illness

## 2021-12-10 NOTE — BH CONSULTATION LIAISON ASSESSMENT NOTE - NSSUICPROTFACT_PSY_ALL_CORE
Responsibility to children, family, or others/Fear of death or the actual act of killing self/Cultural, spiritual and/or moral attitudes against suicide/Restoration beliefs

## 2021-12-10 NOTE — SWALLOW BEDSIDE ASSESSMENT ADULT - SWALLOW EVAL: RECOMMENDED FEEDING/EATING TECHNIQUES
Per MBS 9/20/21 at [facility]: "alternate food with liquid; maintain upright posture during/after eating for 30 mins; position upright (90 degrees); small sips/bites" ALSO: recommend pills in puree.

## 2021-12-10 NOTE — SWALLOW BEDSIDE ASSESSMENT ADULT - ASR SWALLOW RECOMMEND DIAG
Objective evaluation (MBS) completed at [facility] recently (9/20/21) with recs for regular diet and thin liquids. Do not suspect change in swallow fx/CXR clear.

## 2021-12-10 NOTE — SWALLOW BEDSIDE ASSESSMENT ADULT - SWALLOW EVAL: DIAGNOSIS
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 Moser for FTT. Pt was seen today for bedside swallow evaluation which revealed oral dysphagia superimposed upon sparse dentition and xerostomia (h/o XRT). The swallow sequence was characterized by mildly prolonged though generally efficient mastication of soft solid and multiple swallows (x3) with puree. No overt clinical s/s of aspiration or penetration with thin liquids, puree, or soft solid. Timely trigger of pharyngeal swallow. Pt with recent MBS at [facility] (9/20/21) with recs for regular diet and thin liquids via small single sips. Pt currently with clear lungs/no PNA. 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 Moser for FTT. Pt was seen today for bedside swallow evaluation which revealed oral dysphagia superimposed upon sparse dentition and xerostomia (h/o XRT). The swallow sequence was characterized by mildly prolonged though generally efficient mastication of soft solid and multiple swallows (x3) with puree. No overt clinical s/s of aspiration or penetration with thin liquids, puree, or soft solid. Timely trigger of pharyngeal swallow. Pt with recent MBS at [facility] (9/20/21) with recs for regular diet and thin liquids via small single sips. Pt currently with clear lungs/no PNA. Pt pending Nutrition consult.
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
## 2021-12-10 NOTE — DIETITIAN INITIAL EVALUATION ADULT. - REASON INDICATOR FOR ASSESSMENT
consulted for unintentional weight loss and BMI <19. information obtained from patient, RN, NP, speech language pathologist and EMR.

## 2021-12-10 NOTE — DIETITIAN INITIAL EVALUATION ADULT. - ADD RECOMMEND
1) Will continue to monitor PO intake, EN provision weight, labs, skin, GI status and diet 2) Continue current PO diet per speech pathologist recommendations 3) Continue to provide tube feeding in combination with PO diet and Ensure Enlive supplements as pt with reported poor PO intake. Will reassess need for tube feeding if pt PO intake improves. 4) Add thiamin daily for refeeding risk. 5) Malnutrition sticker placed in chart
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
## 2021-12-10 NOTE — CONSULT NOTE ADULT - ASSESSMENT
56 year old man with pmhx squamous cell carcinoma of R tonsil s/p tonsilectomy and chemoradiation 03/2021sent in by pmd  for decreased appetite and weight loss despite PEG.     #SCC R tonsil  -Onc Hx as above  -s/p Tonsilectomy and chemoradiation  -Multiple admission for weight loss  -Nutrition consult  -Consider GI eval  -Recommend CT Neck  -Consider Psy eval given his last admission    Please page with questions or concerns. Will Follow with you.      Marcus Still M.D.  Hematology/Oncology Fellow PGY4  Pager [phone]  After 5pm, please contact on-call team.   56 year old man with pmhx squamous cell carcinoma of R tonsil s/p tonsilectomy and chemoradiation 03/2021sent in by pmd  for decreased appetite and weight loss despite PEG.     #SCC R tonsil  -Onc Hx as above  -s/p Tonsilectomy and chemoradiation  -Multiple admission for weight loss  -Nutrition consult  -Consider GI eval  -Recommend CT Neck/Chest  -Consider Psy eval given his last admission    Please page with questions or concerns. Will Follow with you.      Marcus Still M.D.  Hematology/Oncology Fellow PGY4  Pager [phone]  After 5pm, please contact on-call team.   56 year old man with pmhx squamous cell carcinoma of R tonsil s/p tonsilectomy and chemoradiation 03/2021sent in by pmd  for decreased appetite and weight loss despite PEG.     #SCC R tonsil  -Onc Hx as above  -s/p Tonsilectomy and chemoradiation  -Multiple admission for weight loss  -Nutrition consult  -Consider GI eval  -Recommend CT Neck/Chest, Would switch to IV contrast given we are interested in checking for malignancy   -Consider Psy eval given his last admission    Please page with questions or concerns. Will Follow with you.      Marcus Still M.D.  Hematology/Oncology Fellow PGY4  Pager [phone]  After 5pm, please contact on-call team.   56 year old man with pmhx squamous cell carcinoma of R tonsil s/p tonsilectomy and chemoradiation 03/2021sent in by pmd  for decreased appetite and weight loss despite PEG. Oncology consulted for SCC of R tonsil management.    #SCC R tonsil  -Onc Hx as above  -s/p Tonsilectomy and chemoradiation  -No plan for chemotherapy or radiation at this time  -Multiple admission for weight loss  -Nutrition consult  -Consider GI eval  -S/S eval  -CT Neck/Chest IV contrast  -Consider Psy eval given his last admission    Please page with questions or concerns. Will Follow with you.      Marcus Still M.D.  Hematology/Oncology Fellow PGY4  Pager [phone]  After 5pm, please contact on-call team.

## 2021-12-10 NOTE — CONSULT NOTE ADULT - ASSESSMENT
55 y/o male with pmhx squamous cell carcinoma of tonsills s/p chemo and radiation sent in by pmd  for decreased appetite weight.     FTT  dysphagia  S&S eval noted  pt on easy to chew diet as per SLP  encourage PO intake   PEG in place  cont tube feeds as tolerated with PO intake  nutrition consult  CT neck pending  further recs pending above  will follow  dw pt    Advanced care planning was discussed with patient and family.  Advanced care planning forms were reviewed and discussed.  Risks, benefits and alternatives of gastroenterologic procedures were discussed in detail and all questions were answered.    30 minutes spent.
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
## 2021-12-10 NOTE — PROVIDER CONTACT NOTE (OTHER) - RECOMMENDATIONS
importance of fall precautions and risks involved with falls discussed with patient. pt continues to refuse. to continue to educate pt
continue to monitor pt and maintain aspiration precautions.

## 2021-12-10 NOTE — DIETITIAN INITIAL EVALUATION ADULT. - ORAL INTAKE PTA/DIET HISTORY
visited pt at bedside this morning. Pt has not been able to eat x several months due to reported secretions/phlegm . Per pt, wife has been administering blended meals and TwoCal supplements via PEG tube. Pt reports drinking liquids as able PTA. Confirms NKFA. Is missing teeth. Not taking any vitamins/minerals. Patient endorses constipation PTA and takes stool softeners.

## 2021-12-10 NOTE — BH CONSULTATION LIAISON ASSESSMENT NOTE - NSBHCHARTREVIEWLAB_PSY_A_CORE FT
10.4   2.71  )-----------( 266      ( 10 Dec 2021 07:16 )             31.6     12-10    142  |  108  |  23  ----------------------------<  78  4.4   |  24  |  1.24    Ca    9.3      10 Dec 2021 07:15  Phos  3.2     12-  Mg     2.2     12-    TPro  6.0  /  Alb  3.9  /  TBili  0.3  /  DBili  x   /  AST  17  /  ALT  23  /  AlkPhos  51  12-      Urinalysis Basic - ( 09 Dec 2021 13:54 )    Color: Colorless / Appearance: Clear / S.009 / pH: x  Gluc: x / Ketone: Negative  / Bili: Negative / Urobili: Negative   Blood: x / Protein: Negative / Nitrite: Negative   Leuk Esterase: Negative / RBC: 0 /hpf / WBC 0 /HPF   Sq Epi: x / Non Sq Epi: 0 /hpf / Bacteria: Negative            RADIOLOGY, EKG & ADDITIONAL TESTS: Reviewed.
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
## 2021-12-10 NOTE — DIETITIAN INITIAL EVALUATION ADULT. - ETIOLOGY
decreased ability to consume PO food 2/2 squamous cell carcinoma of tonsils physiological demand for increased nutrients 2/2 catabolic disease state

## 2021-12-10 NOTE — SWALLOW BEDSIDE ASSESSMENT ADULT - ADDITIONAL RECOMMENDATIONS
Marsha artifical saliva spray-- NP, Toan, made aware.     GOALS:  1. Pt/family/caregiver will demonstrate understanding and carryover of dysphagia management (safe swallow guidelines, compensatory strategies, dysphagia diet).  2. Pt will complete dysphagia exercises to maintain swallow function (h/o H&N cancer s/p XRT).  3. Pt will tolerate recommended diet with no overt, clinical s/s of aspiration.
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
## 2021-12-10 NOTE — SWALLOW BEDSIDE ASSESSMENT ADULT - MUCOSAL QUALITY
trace white sticky phlegm on lingual surface and posterior pharyngeal wall-- pt reported xerostomia.
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
## 2021-12-10 NOTE — DIETITIAN INITIAL EVALUATION ADULT. - PERSON TAUGHT/METHOD
Encouraged PO intake as able with emphasis on protein for muscle building and healing considering current diagnosis. patient agreed./verbal instruction/patient instructed

## 2021-12-10 NOTE — BH CONSULTATION LIAISON ASSESSMENT NOTE - NSBHCHARTREVIEWVS_PSY_A_CORE FT
Vital Signs Last 24 Hrs  T(C): 36.7 (10 Dec 2021 05:16), Max: 37.1 (09 Dec 2021 19:29)  T(F): 98.1 (10 Dec 2021 05:16), Max: 98.8 (09 Dec 2021 19:29)  HR: 66 (10 Dec 2021 05:16) (66 - 77)  BP: 115/73 (10 Dec 2021 05:16) (100/60 - 115/73)  BP(mean): 78 (09 Dec 2021 16:55) (78 - 78)  RR: 16 (10 Dec 2021 05:16) (16 - 18)  SpO2: 98% (10 Dec 2021 05:16) (95% - 98%) Vital Signs Last 24 Hrs  T(C): 36.7 (10 Dec 2021 13:20), Max: 37.1 (09 Dec 2021 19:29)  T(F): 98.1 (10 Dec 2021 13:20), Max: 98.8 (09 Dec 2021 19:29)  HR: 83 (10 Dec 2021 13:20) (66 - 83)  BP: 120/75 (10 Dec 2021 13:20) (100/60 - 120/75)  BP(mean): 78 (09 Dec 2021 16:55) (78 - 78)  RR: 16 (10 Dec 2021 13:20) (16 - 18)  SpO2: 98% (10 Dec 2021 13:20) (95% - 98%)

## 2021-12-10 NOTE — SWALLOW BEDSIDE ASSESSMENT ADULT - SLP PERTINENT HISTORY OF CURRENT PROBLEM
55 y/o M with PMH of squamous cell carcinoma of tonsills s/p chemo and radiation sent in by pmd for decreased appetite weight. Patient states that for past several months patient has been unable to eat any foods. Wife has been giving him meals through his feeding tube but he continues to loose weight, feeling very weak and fatigued. Patient endorses loss of weight. However he is able to drink liquids. Patient also c/o feeling dizzy when he tries to stand up too fast. Denies any recent illness, headache, fever, chills, chest pain, difficulty breathing, abdominal pain, n/v/d. Last admission [facility] for FTT and Major Depression with Agitation. 57 y/o M with PMH of squamous cell carcinoma of tonsils s/p chemo and radiation sent in by pmd for decreased appetite weight. Patient states that for past several months patient has been unable to eat any foods. Wife has been giving him meals through his feeding tube but he continues to loose weight, feeling very weak and fatigued. Patient endorses loss of weight. However he is able to drink liquids. Patient also c/o feeling dizzy when he tries to stand up too fast. Denies any recent illness, headache, fever, chills, chest pain, difficulty breathing, abdominal pain, n/v/d. Last admission [facility] for FTT and Major Depression with Agitation.

## 2021-12-10 NOTE — SWALLOW BEDSIDE ASSESSMENT ADULT - COMMENTS
Pt admitted for FTT. Speech swallow eval. Resume PEG feeds am tomorrow. Keep patient on full liquids. GI eval ?Scope. Provider Contact: "Pt passed dysphagia screener. Pt ordered for moderately thick liquids. pt refusing this diet and states, "I have no trouble swallowing regular water". Patient reports drinking frequently and only has trouble with swallowing solid food."    CXR: 12/9/21  IMPRESSION:  Clear lungs.    Pt is known to the Speech Pathology service from recent MBS at [facility] 9/20/21 which revealed a "functional oral phase and mild pharyngeal dysphagia. There is no laryngeal penetration or aspiration before, during or after the swallow with puree, tablet, honey thick liquids and nectar thick liquids. There is laryngeal penetration during the swallow without complete retrieval leaving trace residue in the laryngeal vestibule above the level of the vocal folds. Small Sips reduced the amount/depth of laryngeal penetration maintaining adequate airway protection with thin liquids. Of note, patient reported a texture aversion to solid cookie coated with barium pudding 'I cannot mix foods it makes me vomit.' Patient trialed solid cookie coated with barium pudding, but was not able to swallow trial and expectorated it. Therefore, SLP administered tablet in lieu of solid." Recommendations at that time: Regular solids and Thin liquids Small Sips. Pt admitted for FTT. Speech swallow eval. Resume PEG feeds am tomorrow. Keep patient on full liquids. GI eval ?Scope. Provider Contact: "Pt passed dysphagia screener. Pt ordered for moderately thick liquids. pt refusing this diet and states, "I have no trouble swallowing regular water". Patient reports drinking frequently and only has trouble with swallowing solid food."    CXR: 12/9/21  IMPRESSION:  Clear lungs.    Pt is known to the Speech Pathology service from recent MBS at [facility] 9/20/21 which revealed a "functional oral phase and mild pharyngeal dysphagia. There is no laryngeal penetration or aspiration before, during or after the swallow with puree, tablet, honey thick liquids and nectar thick liquids. There is laryngeal penetration during the swallow without complete retrieval leaving trace residue in the laryngeal vestibule above the level of the vocal folds. Small Sips reduced the amount/depth of laryngeal penetration maintaining adequate airway protection with thin liquids. Of note, patient reported a texture aversion to solid cookie coated with barium pudding 'I cannot mix foods it makes me vomit.' Patient trialed solid cookie coated with barium pudding, but was not able to swallow trial and expectorated it. Therefore, SLP administered tablet in lieu of solid." Recs at that time: Regular solids and Thin liquids Small Sips.

## 2021-12-10 NOTE — DIETITIAN INITIAL EVALUATION ADULT. - PHYSCIAL ASSESSMENT
Skin: no pressure injuries noted per flow sheets  .  Nutrition focused physical exam as below with pt consent.

## 2021-12-10 NOTE — DIETITIAN INITIAL EVALUATION ADULT. - FACTORS AFF FOOD INTAKE
reported secretions. pt with PEG tube due to squamous cell carcinoma of tonsills and decreased ability to sufficiently consume PO/other (specify)
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
## 2021-12-10 NOTE — PROGRESS NOTE ADULT - ASSESSMENT
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 Moser for failure to thrive.       # FTT Encourage po diet.   Speech swallow eval.   Thick liquids as suggested by   Nutrition consult.   Resume PEG feeds am tomorrow.   Keep patient on full liquids.     GI eval ?Scope     # Squamous Cell Ca Tonsils s/p chemo Radiation- Out patient follow up     # Chronic Systolic dysfunction last admission diagnosed- Patient euvolemic. Hold Lasix .   Cards follow up.       # Depression Home meds.    Psych consult

## 2021-12-10 NOTE — PROGRESS NOTE ADULT - SUBJECTIVE AND OBJECTIVE BOX
Patient is a 56y old  Male who presents with a chief complaint of Inability to eat x few weeks (10 Dec 2021 13:56)      SUBJECTIVE / OVERNIGHT EVENTS:  patient feels ok.   No events over night     MEDICATIONS  (STANDING):  aspirin enteric coated 81 milliGRAM(s) Oral daily  Biotene Dry Mouth Oral Rinse 5 milliLiter(s) Swish and Spit two times a day  dextrose 5% + sodium chloride 0.45%. 1000 milliLiter(s) (50 mL/Hr) IV Continuous <Continuous>  heparin   Injectable 5000 Unit(s) SubCutaneous every 8 hours  influenza   Vaccine 0.5 milliLiter(s) IntraMuscular once  metoprolol succinate ER 25 milliGRAM(s) Oral daily  mirtazapine 15 milliGRAM(s) Oral at bedtime  QUEtiapine 25 milliGRAM(s) Oral daily  thiamine 100 milliGRAM(s) Oral daily    MEDICATIONS  (PRN):  ondansetron Injectable 4 milliGRAM(s) IV Push every 8 hours PRN Nausea and/or Vomiting      CAPILLARY BLOOD GLUCOSE        I&O's Summary    09 Dec 2021 07:01  -  10 Dec 2021 07:00  --------------------------------------------------------  IN: 660 mL / OUT: 600 mL / NET: 60 mL    10 Dec 2021 07:01  -  10 Dec 2021 21:08  --------------------------------------------------------  IN: 240 mL / OUT: 500 mL / NET: -260 mL      T(C): 36.8 (12-10-21 @ 19:44), Max: 36.8 (12-10-21 @ 19:44)  HR: 65 (12-10-21 @ 19:44) (65 - 83)  BP: 112/80 (12-10-21 @ 19:44) (112/80 - 120/75)  RR: 18 (12-10-21 @ 19:44) (16 - 18)  SpO2: 97% (12-10-21 @ 19:44) (97% - 98%)    PHYSICAL EXAM:  GENERAL: NAD, well-developed  HEAD:  Atraumatic, Normocephalic  EYES: EOMI, PERRLA, conjunctiva and sclera clear  NECK: Supple, No JVD  CHEST/LUNG: Clear to auscultation bilaterally; No wheeze  HEART: Regular rate and rhythm; No murmurs, rubs, or gallops  ABDOMEN: Soft, Nontender, Nondistended; Bowel sounds present  EXTREMITIES:  2+ Peripheral Pulses, No clubbing, cyanosis, or edema  PSYCH: AAOx3  NEUROLOGY: non-focal  SKIN: No rashes or lesions    LABS:                        10.4   2.71  )-----------( 266      ( 10 Dec 2021 07:16 )             31.6     12-10    142  |  108  |  23  ----------------------------<  78  4.4   |  24  |  1.24    Ca    9.3      10 Dec 2021 07:15  Phos  3.2     12-09  Mg     2.2     12-09    TPro  6.0  /  Alb  3.9  /  TBili  0.3  /  DBili  x   /  AST  17  /  ALT  23  /  AlkPhos  51  12-09          Urinalysis Basic - ( 09 Dec 2021 13:54 )    Color: Colorless / Appearance: Clear / S.009 / pH: x  Gluc: x / Ketone: Negative  / Bili: Negative / Urobili: Negative   Blood: x / Protein: Negative / Nitrite: Negative   Leuk Esterase: Negative / RBC: 0 /hpf / WBC 0 /HPF   Sq Epi: x / Non Sq Epi: 0 /hpf / Bacteria: Negative        RADIOLOGY & ADDITIONAL TESTS:    Imaging Personally Reviewed:    Consultant(s) Notes Reviewed:      Care Discussed with Consultants/Other Providers:

## 2021-12-10 NOTE — DIETITIAN INITIAL EVALUATION ADULT. - REASON FOR ADMISSION
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

## 2021-12-10 NOTE — SWALLOW BEDSIDE ASSESSMENT ADULT - ASR SWALLOW DENTITION
sparse and scattered dentition. Pt reported dentures were at home but he is able to masticate adequately without.

## 2021-12-10 NOTE — DIETITIAN INITIAL EVALUATION ADULT. - ENTERAL
Continue current EN as pt receiving TwoCal bolus at home. Current tube feeding order will provide 1920kcal (33kcal/kg) and 80g (1.3g/kg) protein if 100% provision + 672ml free water

## 2021-12-10 NOTE — DIETITIAN INITIAL EVALUATION ADULT. - PERTINENT LABORATORY DATA
12-10 Na 142  Glu n/a   K+ 4.4   Cr 1.24  BUN 23   Phos n/a   Alb n/a   PAB n/a   Hgb 10.4 g/dL<L> Hct 31.6 %<L>

## 2021-12-10 NOTE — DIETITIAN INITIAL EVALUATION ADULT. - OTHER INFO
Pt seen by Speech Pathologist today who recommended easy to chew diet and thin liquids. Denies current nausea/vomiting/diarrhea. Endorses some mild constipation. Denies difficulty chewing. Last bowel movement 12/9.     Dosing weight 57.6kg 12/9/21  Per HIE:  78.5kg 3/10/21   This equates to a 46pound / 26.5% weight loss <1 year.   Patient endorses significant weight loss since March as well.
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
## 2021-12-11 ENCOUNTER — TRANSCRIPTION ENCOUNTER (OUTPATIENT)
Age: 56
End: 2021-12-11

## 2021-12-11 LAB
ANION GAP SERPL CALC-SCNC: 10 MMOL/L — SIGNIFICANT CHANGE UP (ref 5–17)
BUN SERPL-MCNC: 22 MG/DL — SIGNIFICANT CHANGE UP (ref 7–23)
CALCIUM SERPL-MCNC: 9 MG/DL — SIGNIFICANT CHANGE UP (ref 8.4–10.5)
CHLORIDE SERPL-SCNC: 102 MMOL/L — SIGNIFICANT CHANGE UP (ref 96–108)
CO2 SERPL-SCNC: 26 MMOL/L — SIGNIFICANT CHANGE UP (ref 22–31)
CREAT SERPL-MCNC: 1.22 MG/DL — SIGNIFICANT CHANGE UP (ref 0.5–1.3)
GLUCOSE SERPL-MCNC: 78 MG/DL — SIGNIFICANT CHANGE UP (ref 70–99)
MAGNESIUM SERPL-MCNC: 2 MG/DL — SIGNIFICANT CHANGE UP (ref 1.6–2.6)
PHOSPHATE SERPL-MCNC: 3.2 MG/DL — SIGNIFICANT CHANGE UP (ref 2.5–4.5)
POTASSIUM SERPL-MCNC: 4.4 MMOL/L — SIGNIFICANT CHANGE UP (ref 3.5–5.3)
POTASSIUM SERPL-SCNC: 4.4 MMOL/L — SIGNIFICANT CHANGE UP (ref 3.5–5.3)
SODIUM SERPL-SCNC: 138 MMOL/L — SIGNIFICANT CHANGE UP (ref 135–145)

## 2021-12-11 RX ORDER — THIAMINE MONONITRATE (VIT B1) 100 MG
1 TABLET ORAL
Qty: 0 | Refills: 0 | DISCHARGE
Start: 2021-12-11

## 2021-12-11 RX ORDER — FUROSEMIDE 40 MG
1 TABLET ORAL
Qty: 0 | Refills: 0 | DISCHARGE

## 2021-12-11 RX ORDER — LANOLIN ALCOHOL/MO/W.PET/CERES
3 CREAM (GRAM) TOPICAL ONCE
Refills: 0 | Status: COMPLETED | OUTPATIENT
Start: 2021-12-11 | End: 2021-12-11

## 2021-12-11 RX ORDER — QUETIAPINE FUMARATE 200 MG/1
1 TABLET, FILM COATED ORAL
Qty: 0 | Refills: 0 | DISCHARGE
Start: 2021-12-11

## 2021-12-11 RX ADMIN — Medication 3 MILLIGRAM(S): at 23:36

## 2021-12-11 RX ADMIN — Medication 81 MILLIGRAM(S): at 12:05

## 2021-12-11 RX ADMIN — Medication 5 MILLILITER(S): at 17:43

## 2021-12-11 RX ADMIN — Medication 100 MILLIGRAM(S): at 12:05

## 2021-12-11 RX ADMIN — HEPARIN SODIUM 5000 UNIT(S): 5000 INJECTION INTRAVENOUS; SUBCUTANEOUS at 05:39

## 2021-12-11 RX ADMIN — HEPARIN SODIUM 5000 UNIT(S): 5000 INJECTION INTRAVENOUS; SUBCUTANEOUS at 22:00

## 2021-12-11 RX ADMIN — MIRTAZAPINE 15 MILLIGRAM(S): 45 TABLET, ORALLY DISINTEGRATING ORAL at 22:00

## 2021-12-11 RX ADMIN — Medication 25 MILLIGRAM(S): at 05:39

## 2021-12-11 RX ADMIN — Medication 5 MILLILITER(S): at 05:39

## 2021-12-11 RX ADMIN — QUETIAPINE FUMARATE 25 MILLIGRAM(S): 200 TABLET, FILM COATED ORAL at 12:05
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
## 2021-12-11 NOTE — CONSULT NOTE ADULT - ASSESSMENT
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     EKG: Sinus javon 51 no acute changes    1. Moderate LV dysfunction  -echo with moderate LV systolic dysfunction and mild diastolic dysfunction 9/2021  -spoke to patient previous admission he would not like any invasive procedures. is FTT   - would like medical management  -c/w asa and metoprolol    2. FTT  -t/t per primary team    3. DVT prophylaxis  -hep subq

## 2021-12-11 NOTE — DISCHARGE NOTE PROVIDER - DETAILS OF MALNUTRITION DIAGNOSIS/DIAGNOSES
This patient has been assessed with a concern for Malnutrition and was treated during this hospitalization for the following Nutrition diagnosis/diagnoses:     -  12/10/2021: Severe protein-calorie malnutrition   -  12/10/2021: Underweight (BMI < 19)

## 2021-12-11 NOTE — PROVIDER CONTACT NOTE (OTHER) - ASSESSMENT
pt alert and oriented times three
pt is A+Ox4. high fall risk due to recent fall and weakness. bed alarm placed and pt getting angry and frustrated and refusing to allow RN to place bed alarm. patient educated on importance of maintaining fall precautions to prevent future falls and risks discussed with patient, but patient continues to refuse. Patient refusing to call RN when getting oob or when going to bathroom.
pt alert and oriented times three
pt ordered for moderately thick liquids. pt refusing this diet and states, "I have no trouble swallowing regular water". Patient reports drinking frequently and only has trouble with swallowing solid food.   dysphagia screen performed and pt passed. pt took oral medications with water.

## 2021-12-11 NOTE — PROGRESS NOTE ADULT - ASSESSMENT
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 Moser for failure to thrive.       # FTT Encourage po diet.   Speech swallow eval noted   Thick liquids as suggested by   Nutrition consult.   PEG feeds   CT neck   GI eval appreciated     # Squamous Cell Ca Tonsils s/p chemo Radiation- Hem On cocnsulted   CT neck pending      # Chronic Systolic dysfunction last admission diagnosed- Patient euvolemic. Hold Lasix .   Cards follow up.       # Depression Home meds.    Psych consult appreciated
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
## 2021-12-11 NOTE — DISCHARGE NOTE PROVIDER - HOSPITAL COURSE
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 Moser for failure to thrive.   FTT Encourage po diet.   Speech swallow eval noted   Thick liquids as suggested by   Nutrition consult.   PEG feeds   CT neck   GI eval appreciated     Squamous Cell Ca Tonsils s/p chemo Radiation- Hem On cocnsulted   CT neck pending      Chronic Systolic dysfunction last admission diagnosed- Patient euvolemic. Hold Lasix .   Cards follow up.       Depression Home meds.    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 Moser for failure to thrive.   FTT Encourage po diet.   Speech swallow eval noted   Thick liquids as suggested by   Nutrition consult.   PEG feeds   CT neck   GI eval appreciated   dysphagia  S&S eval noted  pt on easy to chew diet as per SLP  encourage PO intake   PEG in place  cont tube feeds as tolerated with PO intake  nutrition consult  CT neck results pending  further recs pending above    Squamous Cell Ca Tonsils s/p chemo Radiation- Hem On cocnsulted   CT neck pending      Chronic Systolic dysfunction last admission diagnosed- Patient euvolemic. Hold Lasix .   Cards follow up.     Depression Home meds.    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 Moser for failure to thrive.   FTT Encourage po diet.   Speech swallow eval noted   Thick liquids as suggested by   Nutrition consult.   PEG feeds   CT neck   GI eval appreciated   dysphagia  S&S eval noted  pt on easy to chew diet as per SLP  encourage PO intake   PEG in place  cont tube feeds as tolerated with PO intake  nutrition consult  CT neck results pending  further recs pending above    Squamous Cell Ca Tonsils s/p chemo Radiation- Hem On cocnsulted   CT neck pending      Chronic Systolic dysfunction last admission diagnosed- Patient euvolemic. Hold Lasix .   Cards follow up.     Depression Home meds.    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 Moser for failure to thrive.     FTT/ Squamous Cell Ca Tonsils s/p chemo Radiation:   Encourage po diet.   Speech swallow eval noted.   Nutrition consult.   Thick liquids recommended.   PEG feeds   ENT f/up noted.  CT neck reviewed.  GI f/up appreciated .  Barium swallow  - No Esophageal stricture.     Dysphagia:  S  and S eval noted.  pt on easy to chew diet as per SLP  encourage PO intake   PEG in place  cont tube feeds as tolerated with PO intake  nutrition consult      Squamous Cell Ca Tonsils s/p chemo Radiation- Hem On consulted   CT neck reviewed.       Chronic Systolic dysfunction last admission diagnosed- Patient euvolemic. Hold Lasix .   Cards follow up.     Depression Home meds.    Psych consult appreciated.       Chronic Systolic dysfunction last admission diagnosed- Patient euvolemic. Hold Lasix .   Cardiology  follow up.     Pt medically cleared for discharge home per GI MD Dr Watts, CV Dr Valenzuela and Med Attending Dr Boyd.      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 Moser for failure to thrive.     FTT/ Squamous Cell Ca Tonsils s/p chemo Radiation:   Encourage po diet.   Speech swallow eval noted.   Nutrition consult.   Thick liquids recommended.   PEG feeds   ENT f/up noted.  CT neck reviewed.  GI f/up appreciated .  Barium swallow  - No Esophageal stricture.     Dysphagia:  S  and S eval noted.  pt on easy to chew diet as per SLP  encourage PO intake   PEG in place  cont tube feeds as tolerated with PO intake  Nutrition consult      Squamous Cell Ca Tonsils s/p chemo Radiation- Hem On consulted   CT neck reviewed.       Chronic Systolic dysfunction last admission diagnosed- Patient euvolemic. Hold Lasix .   Cards follow up.     Depression Home meds.    Psych consult appreciated.       Chronic Systolic dysfunction last admission diagnosed- Patient euvolemic. Hold Lasix .   Cardiology  follow up.     Pt medically cleared for discharge home per GI MD Dr Watts, CV Dr Valenzuela and Med Attending Dr Boyd.

## 2021-12-11 NOTE — DISCHARGE NOTE PROVIDER - PROVIDER TOKENS
PROVIDER:[TOKEN:[[id number]]] PROVIDER:[TOKEN:[[id number]]],PROVIDER:[TOKEN:[[id number]]] PROVIDER:[TOKEN:[[id number]]],PROVIDER:[TOKEN:[[id number]]],PROVIDER:[TOKEN:[[id number]],FOLLOWUP:[1 week]],PROVIDER:[TOKEN:[[id number]],FOLLOWUP:[2 weeks]]

## 2021-12-11 NOTE — PROGRESS NOTE ADULT - ASSESSMENT
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     FTT  dysphagia  S&S eval noted  pt on easy to chew diet as per SLP  encourage PO intake   PEG in place  cont tube feeds as tolerated with PO intake  nutrition consult  CT neck results pending  further recs pending above  will follow  dw pt    Advanced care planning was discussed with patient and family.  Advanced care planning forms were reviewed and discussed.  Risks, benefits and alternatives of gastroenterologic procedures were discussed in detail and all questions were answered.    30 minutes spent.

## 2021-12-11 NOTE — PROGRESS NOTE ADULT - SUBJECTIVE AND OBJECTIVE BOX
[facility]  Willie Moody PA-C  [address] [place] [address]  [phone]      INTERVAL HPI/OVERNIGHT EVENTS:  pt seen and examined, lying in bed  on easy to chew diet but still with poor PO intake, encouraged po intake  tolerating supplemental tube feedings     MEDICATIONS  (STANDING):  aspirin enteric coated 81 milliGRAM(s) Oral daily  Biotene Dry Mouth Oral Rinse 5 milliLiter(s) Swish and Spit two times a day  dextrose 5% + sodium chloride 0.45%. 1000 milliLiter(s) (50 mL/Hr) IV Continuous <Continuous>  heparin   Injectable 5000 Unit(s) SubCutaneous every 8 hours  influenza   Vaccine 0.5 milliLiter(s) IntraMuscular once  metoprolol succinate ER 25 milliGRAM(s) Oral daily  mirtazapine 15 milliGRAM(s) Oral at bedtime  QUEtiapine 25 milliGRAM(s) Oral daily  thiamine 100 milliGRAM(s) Oral daily    MEDICATIONS  (PRN):  ondansetron Injectable 4 milliGRAM(s) IV Push every 8 hours PRN Nausea and/or Vomiting      Allergies    No Known Allergies    Intolerances        ROS:   General:  No wt loss, fevers, chills, night sweats, fatigue,   Eyes:  Good vision, no reported pain  ENT:  No sore throat, pain, runny nose, dysphagia  CV:  No pain, palpitations, hypo/hypertension  Resp:  No dyspnea, cough, tachypnea, wheezing  GI:  No pain, No nausea, No vomiting, No diarrhea, No constipation, No weight loss, No fever, No pruritis, No rectal bleeding, No tarry stools, + dysphagia,  :  No pain, bleeding, incontinence, nocturia  Muscle:  No pain, weakness  Neuro:  No weakness, tingling, memory problems  Psych:  No fatigue, insomnia, mood problems, depression  Endocrine:  No polyuria, polydipsia, cold/heat intolerance  Heme:  No petechiae, ecchymosis, easy bruisability  Skin:  No rash, tattoos, scars, edema      PHYSICAL EXAM:   Vital Signs:  Vital Signs Last 24 Hrs  T(C): 36.6 (11 Dec 2021 05:32), Max: 36.8 (10 Dec 2021 19:44)  T(F): 97.9 (11 Dec 2021 05:32), Max: 98.3 (10 Dec 2021 19:44)  HR: 71 (11 Dec 2021 05:32) (65 - 83)  BP: 103/72 (11 Dec 2021 05:32) (103/72 - 120/75)  BP(mean): --  RR: 17 (11 Dec 2021 05:32) (16 - 18)  SpO2: 96% (11 Dec 2021 05:32) (96% - 98%)  Daily     Daily     GENERAL:  Appears stated age,   HEENT:  NC/AT,    CHEST:  Full & symmetric excursion,   HEART:  Regular rhythm,  ABDOMEN:  Soft, non-tender, non-distended,  EXTEREMITIES:  no cyanosis  SKIN:  No rash  NEURO:  Alert,       LABS:                        10.4   2.71  )-----------( 266      ( 10 Dec 2021 07:16 )             31.6     12-10    142  |  108  |  23  ----------------------------<  78  4.4   |  24  |  1.24    Ca    9.3      10 Dec 2021 07:15  Phos  3.2     12-  Mg     2.2     12-    TPro  6.0  /  Alb  3.9  /  TBili  0.3  /  DBili  x   /  AST  17  /  ALT  23  /  AlkPhos  51  12-09      Urinalysis Basic - ( 09 Dec 2021 13:54 )    Color: Colorless / Appearance: Clear / S.009 / pH: x  Gluc: x / Ketone: Negative  / Bili: Negative / Urobili: Negative   Blood: x / Protein: Negative / Nitrite: Negative   Leuk Esterase: Negative / RBC: 0 /hpf / WBC 0 /HPF   Sq Epi: x / Non Sq Epi: 0 /hpf / Bacteria: Negative        RADIOLOGY & ADDITIONAL TESTS:

## 2021-12-11 NOTE — PROGRESS NOTE ADULT - SUBJECTIVE AND OBJECTIVE BOX
Patient is a 56y old  Male who presents with a chief complaint of Inability to eat x few weeks (10 Dec 2021 13:56)      SUBJECTIVE / OVERNIGHT EVENTS:  patient feels ok.   No events over night     T(C): 36.6 (12-11-21 @ 05:32), Max: 36.6 (12-11-21 @ 05:32)  HR: 71 (12-11-21 @ 05:32) (71 - 71)  BP: 103/72 (12-11-21 @ 05:32) (103/72 - 103/72)  RR: 17 (12-11-21 @ 05:32) (17 - 17)  SpO2: 96% (12-11-21 @ 05:32) (96% - 96%)      MEDICATIONS  (STANDING):  aspirin enteric coated 81 milliGRAM(s) Oral daily  Biotene Dry Mouth Oral Rinse 5 milliLiter(s) Swish and Spit two times a day  dextrose 5% + sodium chloride 0.45%. 1000 milliLiter(s) (50 mL/Hr) IV Continuous <Continuous>  heparin   Injectable 5000 Unit(s) SubCutaneous every 8 hours  influenza   Vaccine 0.5 milliLiter(s) IntraMuscular once  metoprolol succinate ER 25 milliGRAM(s) Oral daily  mirtazapine 15 milliGRAM(s) Oral at bedtime  QUEtiapine 25 milliGRAM(s) Oral daily  thiamine 100 milliGRAM(s) Oral daily    MEDICATIONS  (PRN):  ondansetron Injectable 4 milliGRAM(s) IV Push every 8 hours PRN Nausea and/or Vomiting    PHYSICAL EXAM:  GENERAL: NAD, well-developed  HEAD:  Atraumatic, Normocephalic  EYES: EOMI, PERRLA, conjunctiva and sclera clear  NECK: Supple, No JVD  CHEST/LUNG: Clear to auscultation bilaterally; No wheeze  HEART: Regular rate and rhythm; No murmurs, rubs, or gallops  ABDOMEN: Soft, Nontender, Nondistended; Bowel sounds present  EXTREMITIES:  2+ Peripheral Pulses, No clubbing, cyanosis, or edema  PSYCH: AAOx3  NEUROLOGY: non-focal  SKIN: No rashes or lesions                          10.4   2.71  )-----------( 266      ( 10 Dec 2021 07:16 )             31.6           LIVER FUNCTIONS - ( 09 Dec 2021 12:36 )  Alb: 3.9 g/dL / Pro: 6.0 g/dL / ALK PHOS: 51 U/L / ALT: 23 U/L / AST: 17 U/L / GGT: x               RADIOLOGY & ADDITIONAL TESTS:    Imaging Personally Reviewed:    Consultant(s) Notes Reviewed:      Care Discussed with Consultants/Other Providers:

## 2021-12-11 NOTE — DISCHARGE NOTE PROVIDER - NSDCFUSCHEDAPPT_GEN_ALL_CORE_FT
TRA HORNE ; 01/28/2022 ; [facility]  TRA HORNE ; 01/28/2022 ; [facility] [address]

## 2021-12-11 NOTE — DISCHARGE NOTE PROVIDER - CARE PROVIDERS DIRECT ADDRESSES
,DirectAddress_Unknown ,DirectAddress_Unknown,[email or web address] ,DirectAddress_Unknown,[email or web address],DirectAddress_Unknown,DirectAddress_Unknown

## 2021-12-11 NOTE — DISCHARGE NOTE PROVIDER - CARE PROVIDER_API CALL
JATIN MEZA [facility]  [address] [place] [address]  Phone: [phone]  Fax: [phone]  Follow Up Time:    MEZAJATIN [facility]  [address] [id number]  Phone: [phone]  Fax: [phone]  Follow Up Time:     AttarianRadha)  Internal Medicine; Medical Oncology  [address] [place] [address]  Phone: [phone]  Fax: [phone]  Follow Up Time:    JATIN MEZA [facility]  [address] [place] [address]  Phone: [phone]  Fax: [phone]  Follow Up Time:     Radha Quiroz)  Internal Medicine; Medical Oncology  [address] [place] [address]  Phone: [phone]  Fax: [phone]  Follow Up Time:     Jose Valenzuela)  Cardiovascular Disease; Internal Medicine  [address] [place] [address]  Phone: [phone]  Fax: [phone]  Follow Up Time: 1 week    Ace Harvey ()  Internal Medicine  [address] [place] [address]  Phone: [phone]  Fax: [phone]  Follow Up Time: 2 weeks

## 2021-12-11 NOTE — DISCHARGE NOTE PROVIDER - NSDCCPCAREPLAN_GEN_ALL_CORE_FT
PRINCIPAL DISCHARGE DIAGNOSIS  Diagnosis: Adult failure to thrive  Assessment and Plan of Treatment:        PRINCIPAL DISCHARGE DIAGNOSIS  Diagnosis: Adult failure to thrive  Assessment and Plan of Treatment: Follow up with your primary care provider      SECONDARY DISCHARGE DIAGNOSES  Diagnosis: Squamous cell carcinoma of tonsillar pillar  Assessment and Plan of Treatment: follow up with your oncologist     BMI 58.0 PRINCIPAL DISCHARGE DIAGNOSIS  Diagnosis: Adult failure to thrive  Assessment and Plan of Treatment: you are leaving against medical advice  Follow up with your primary care provider      SECONDARY DISCHARGE DIAGNOSES  Diagnosis: Squamous cell carcinoma of tonsillar pillar  Assessment and Plan of Treatment: follow up with your oncologist     PRINCIPAL DISCHARGE DIAGNOSIS  Diagnosis: Adult failure to thrive  Assessment and Plan of Treatment:   Follow up with your Gastroenterologist within 1 to 2 weeks.      SECONDARY DISCHARGE DIAGNOSES  Diagnosis: Squamous cell carcinoma of tonsillar pillar  Assessment and Plan of Treatment: Follow up with your Oncologist within 1 week.    Diagnosis: Ventricular dysfunction of heart  Assessment and Plan of Treatment: Take medications as prescribed.  Follow up with Cardiologist Dr Valenzuela in 1 week.

## 2021-12-11 NOTE — DISCHARGE NOTE PROVIDER - NSDCMRMEDTOKEN_GEN_ALL_CORE_FT
aspirin 81 mg oral delayed release tablet: 1 tab(s) orally once a day  bisacodyl 5 mg oral delayed release tablet: 1 tab(s) orally once a day  enalapril 2.5 mg oral tablet: 1 tab(s) orally once a day  fluconazole 100 mg oral tablet: 1 tab(s) orally once a day  furosemide 40 mg oral tablet: 1 tab(s) orally once a day  hydrocodone-acetaminophen 10 mg-325 mg oral tablet: 1 tab(s) orally every 6 hours, As Needed for Pain    **Per iSTOP (reference #[id number])  metoclopramide 10 mg oral tablet: 1 tab(s) orally every 6 hours, As Needed  mirtazapine 15 mg oral tablet: 1 tab(s) orally once a day (at bedtime)  QUEtiapine 100 mg oral tablet: 1 tab(s) orally once a day (at bedtime)  QUEtiapine 25 mg oral tablet: 1 tab(s) orally once a day at 8am  Toprol-XL 25 mg oral tablet, extended release: 1 tab(s) orally once a day  Twocal HN Bolus feeds via Peg tube 240: 240 milliliter(s) by gastrostomy tube every 6 hours     Total 960ml per 24hrs    Dispense: 1 month Supply   aspirin 81 mg oral delayed release tablet: 1 tab(s) orally once a day  metoclopramide 10 mg oral tablet: 1 tab(s) orally every 6 hours, As Needed  mirtazapine 15 mg oral tablet: 1 tab(s) orally once a day (at bedtime)  QUEtiapine 25 mg oral tablet: 1 tab(s) orally once a day  thiamine 100 mg oral tablet: 1 tab(s) orally once a day  Toprol-XL 25 mg oral tablet, extended release: 1 tab(s) orally once a day  Twocal HN Bolus feeds via Peg tube 240: 240 milliliter(s) by gastrostomy tube every 6 hours     Total 960ml per 24hrs    Dispense: 1 month Supply   aspirin 81 mg oral delayed release tablet: 1 tab(s) orally once a day  metoclopramide 10 mg oral tablet: 1 tab(s) orally every 6 hours, As Needed  mirtazapine 15 mg oral tablet: 1 tab(s) orally once a day (at bedtime)  pantoprazole 40 mg oral delayed release tablet: 1 tab(s) orally once a day (after a meal)   QUEtiapine 25 mg oral tablet: 1 tab(s) orally once a day  senna oral tablet: 2 tab(s) orally once a day (at bedtime.  HOLD FOR LOOSE STOOLS.   thiamine 100 mg oral tablet: 1 tab(s) orally once a day  Toprol-XL 25 mg oral tablet, extended release: 1 tab(s) orally once a day  Twocal HN Bolus feeds via Peg tube 240: 240 milliliter(s) by gastrostomy tube every 6 hours     Total 960ml per 24hrs    Dispense: 1 month Supply

## 2021-12-11 NOTE — DISCHARGE NOTE PROVIDER - NSDCFUADDINST_GEN_ALL_CORE_FT
DIET:  Easy to chew food.  Tube Feeds:  2 Shemar HN - 1 can every 6 hours over 1 hour.  PLUS:  Ensure Enlive 3 cans per day.
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
## 2021-12-11 NOTE — DISCHARGE NOTE PROVIDER - NSDCFUADDAPPT_GEN_ALL_CORE_FT
APPTS ARE READY TO BE MADE: [x ] YES    Best Family or Patient Contact (if needed): Patient.     APPTS ARE READY TO BE MADE: [x ] YES    Best Family or Patient Contact (if needed): Patient.     At this time patient declined scheduling assistance. French

## 2021-12-12 RX ADMIN — HEPARIN SODIUM 5000 UNIT(S): 5000 INJECTION INTRAVENOUS; SUBCUTANEOUS at 22:50

## 2021-12-12 RX ADMIN — Medication 25 MILLIGRAM(S): at 14:40

## 2021-12-12 RX ADMIN — QUETIAPINE FUMARATE 25 MILLIGRAM(S): 200 TABLET, FILM COATED ORAL at 11:47

## 2021-12-12 RX ADMIN — Medication 5 MILLILITER(S): at 06:58

## 2021-12-12 RX ADMIN — HEPARIN SODIUM 5000 UNIT(S): 5000 INJECTION INTRAVENOUS; SUBCUTANEOUS at 06:58

## 2021-12-12 RX ADMIN — Medication 81 MILLIGRAM(S): at 11:47

## 2021-12-12 RX ADMIN — Medication 100 MILLIGRAM(S): at 11:47

## 2021-12-12 RX ADMIN — MIRTAZAPINE 15 MILLIGRAM(S): 45 TABLET, ORALLY DISINTEGRATING ORAL at 22:50

## 2021-12-12 RX ADMIN — Medication 5 MILLILITER(S): at 18:27

## 2021-12-12 RX ADMIN — HEPARIN SODIUM 5000 UNIT(S): 5000 INJECTION INTRAVENOUS; SUBCUTANEOUS at 14:40

## 2021-12-12 NOTE — PROGRESS NOTE ADULT - ASSESSMENT
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 Moser for failure to thrive.       # FTT Encourage po diet.   Speech swallow eval noted   Thick liquids as suggested by   Nutrition consult.   PEG feeds   CT neck   GI eval appreciated     # Squamous Cell Ca Tonsils s/p chemo Radiation- Hem On cocnsulted   CT neck pending      # Chronic Systolic dysfunction last admission diagnosed- Patient euvolemic. Hold Lasix .   Cards follow up.       # Depression Home meds.    Psych consult appreciated

## 2021-12-12 NOTE — PROGRESS NOTE ADULT - ASSESSMENT
55 y/o male with pmhx squamous cell carcinoma of tonsills s/p chemo and radiation sent in by pmd  for decreased appetite weight.     FTT  dysphagia  S&S eval noted  pt on easy to chew diet as per SLP  encourage PO intake   PEG in place  cont tube feeds as tolerated with PO intake  nutrition consult  CT neck results pending  further recs pending above  will follow  dw pt    Advanced care planning was discussed with patient and family.  Advanced care planning forms were reviewed and discussed.  Risks, benefits and alternatives of gastroenterologic procedures were discussed in detail and all questions were answered.    30 minutes spent.
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
## 2021-12-12 NOTE — PROGRESS NOTE ADULT - ASSESSMENT
55 y/o male with pmhx squamous cell carcinoma of tonsills s/p chemo and radiation sent in by pmd  for decreased appetite weight.     EKG: Sinus javon 51 no acute changes    1. Moderate LV dysfunction  -echo with moderate LV systolic dysfunction and mild diastolic dysfunction 9/2021  -spoke to patient previous admission he would not like any invasive procedures. is FTT   - would like medical management  -c/w asa and metoprolol    2. FTT  -t/t per primary team    3. DVT prophylaxis  -hep subq

## 2021-12-12 NOTE — PROGRESS NOTE ADULT - SUBJECTIVE AND OBJECTIVE BOX
[facility]  Willie Moody PA-C  [address] [place] [address] [phone]      INTERVAL HPI/OVERNIGHT EVENTS:  pt seen and examined, lying in bed, states he doesn't want to stay in hospital   on easy to chew diet but still with poor PO intake, encouraged po intake  tolerating supplemental tube feedings     MEDICATIONS  (STANDING):  aspirin enteric coated 81 milliGRAM(s) Oral daily  Biotene Dry Mouth Oral Rinse 5 milliLiter(s) Swish and Spit two times a day  dextrose 5% + sodium chloride 0.45%. 1000 milliLiter(s) (50 mL/Hr) IV Continuous <Continuous>  heparin   Injectable 5000 Unit(s) SubCutaneous every 8 hours  influenza   Vaccine 0.5 milliLiter(s) IntraMuscular once  metoprolol succinate ER 25 milliGRAM(s) Oral daily  mirtazapine 15 milliGRAM(s) Oral at bedtime  QUEtiapine 25 milliGRAM(s) Oral daily  thiamine 100 milliGRAM(s) Oral daily    MEDICATIONS  (PRN):  ondansetron Injectable 4 milliGRAM(s) IV Push every 8 hours PRN Nausea and/or Vomiting      Allergies    No Known Allergies    Intolerances        ROS:   General:  No wt loss, fevers, chills, night sweats, fatigue,   Eyes:  Good vision, no reported pain  ENT:  No sore throat, pain, runny nose, dysphagia  CV:  No pain, palpitations, hypo/hypertension  Resp:  No dyspnea, cough, tachypnea, wheezing  GI:  No pain, No nausea, No vomiting, No diarrhea, No constipation, No weight loss, No fever, No pruritis, No rectal bleeding, No tarry stools, + dysphagia,  :  No pain, bleeding, incontinence, nocturia  Muscle:  No pain, weakness  Neuro:  No weakness, tingling, memory problems  Psych:  No fatigue, insomnia, mood problems, depression  Endocrine:  No polyuria, polydipsia, cold/heat intolerance  Heme:  No petechiae, ecchymosis, easy bruisability  Skin:  No rash, tattoos, scars, edema      PHYSICAL EXAM:   Vital Signs:  Vital Signs Last 24 Hrs  T(C): 36.6 (11 Dec 2021 05:32), Max: 36.8 (10 Dec 2021 19:44)  T(F): 97.9 (11 Dec 2021 05:32), Max: 98.3 (10 Dec 2021 19:44)  HR: 71 (11 Dec 2021 05:32) (65 - 83)  BP: 103/72 (11 Dec 2021 05:32) (103/72 - 120/75)  BP(mean): --  RR: 17 (11 Dec 2021 05:32) (16 - 18)  SpO2: 96% (11 Dec 2021 05:32) (96% - 98%)  Daily     Daily     GENERAL:  Appears stated age,   HEENT:  NC/AT,    CHEST:  Full & symmetric excursion,   HEART:  Regular rhythm,  ABDOMEN:  Soft, non-tender, non-distended,  EXTEREMITIES:  no cyanosis  SKIN:  No rash  NEURO:  Alert,       LABS:                        10.4   2.71  )-----------( 266      ( 10 Dec 2021 07:16 )             31.6     12-10    142  |  108  |  23  ----------------------------<  78  4.4   |  24  |  1.24    Ca    9.3      10 Dec 2021 07:15  Phos  3.2     12-  Mg     2.2     12-    TPro  6.0  /  Alb  3.9  /  TBili  0.3  /  DBili  x   /  AST  17  /  ALT  23  /  AlkPhos  51  12-      Urinalysis Basic - ( 09 Dec 2021 13:54 )    Color: Colorless / Appearance: Clear / S.009 / pH: x  Gluc: x / Ketone: Negative  / Bili: Negative / Urobili: Negative   Blood: x / Protein: Negative / Nitrite: Negative   Leuk Esterase: Negative / RBC: 0 /hpf / WBC 0 /HPF   Sq Epi: x / Non Sq Epi: 0 /hpf / Bacteria: Negative        RADIOLOGY & ADDITIONAL TESTS:

## 2021-12-13 RX ORDER — SENNA PLUS 8.6 MG/1
2 TABLET ORAL ONCE
Refills: 0 | Status: COMPLETED | OUTPATIENT
Start: 2021-12-13 | End: 2021-12-13

## 2021-12-13 RX ORDER — LANOLIN ALCOHOL/MO/W.PET/CERES
3 CREAM (GRAM) TOPICAL ONCE
Refills: 0 | Status: COMPLETED | OUTPATIENT
Start: 2021-12-13 | End: 2021-12-13

## 2021-12-13 RX ADMIN — Medication 5 MILLILITER(S): at 17:19

## 2021-12-13 RX ADMIN — QUETIAPINE FUMARATE 25 MILLIGRAM(S): 200 TABLET, FILM COATED ORAL at 12:41

## 2021-12-13 RX ADMIN — Medication 3 MILLIGRAM(S): at 21:34

## 2021-12-13 RX ADMIN — Medication 100 MILLIGRAM(S): at 12:41

## 2021-12-13 RX ADMIN — Medication 5 MILLILITER(S): at 05:34

## 2021-12-13 RX ADMIN — Medication 81 MILLIGRAM(S): at 12:41

## 2021-12-13 RX ADMIN — HEPARIN SODIUM 5000 UNIT(S): 5000 INJECTION INTRAVENOUS; SUBCUTANEOUS at 05:34

## 2021-12-13 RX ADMIN — Medication 25 MILLIGRAM(S): at 05:35

## 2021-12-13 RX ADMIN — SENNA PLUS 2 TABLET(S): 8.6 TABLET ORAL at 21:33

## 2021-12-13 NOTE — PROGRESS NOTE ADULT - SUBJECTIVE AND OBJECTIVE BOX
Patient is a 56y old  Male who presents with a chief complaint of Inability to eat x few weeks (13 Dec 2021 16:08)      SUBJECTIVE / OVERNIGHT EVENTS:    Events noted.  CONSTITUTIONAL: No fever,  or fatigue  RESPIRATORY: No cough, wheezing,  No shortness of breath  CARDIOVASCULAR: No chest pain, palpitations, dizziness, or leg swelling  GASTROINTESTINAL: Poor po intake  NEUROLOGICAL: No headache    MEDICATIONS  (STANDING):  aspirin enteric coated 81 milliGRAM(s) Oral daily  Biotene Dry Mouth Oral Rinse 5 milliLiter(s) Swish and Spit two times a day  dextrose 5% + sodium chloride 0.45%. 1000 milliLiter(s) (50 mL/Hr) IV Continuous <Continuous>  heparin   Injectable 5000 Unit(s) SubCutaneous every 8 hours  influenza   Vaccine 0.5 milliLiter(s) IntraMuscular once  metoprolol succinate ER 25 milliGRAM(s) Oral daily  mirtazapine 15 milliGRAM(s) Oral at bedtime  QUEtiapine 25 milliGRAM(s) Oral daily  thiamine 100 milliGRAM(s) Oral daily    MEDICATIONS  (PRN):  ondansetron Injectable 4 milliGRAM(s) IV Push every 8 hours PRN Nausea and/or Vomiting        CAPILLARY BLOOD GLUCOSE        I&O's Summary    12 Dec 2021 07:01  -  13 Dec 2021 07:00  --------------------------------------------------------  IN: 1710 mL / OUT: 0 mL / NET: 1710 mL    13 Dec 2021 07:01  -  13 Dec 2021 22:09  --------------------------------------------------------  IN: 1080 mL / OUT: 0 mL / NET: 1080 mL        T(C): 37.3 (12-13-21 @ 20:12), Max: 37.3 (12-13-21 @ 20:12)  HR: 77 (12-13-21 @ 20:12) (66 - 77)  BP: 106/71 (12-13-21 @ 20:12) (106/71 - 120/76)  RR: 18 (12-13-21 @ 20:12) (17 - 18)  SpO2: 99% (12-13-21 @ 20:12) (97% - 99%)    PHYSICAL EXAM:    NECK: Supple, No JVD  CHEST/LUNG: Clear to auscultation bilaterally; No wheezing.  HEART: Regular rate and rhythm; No murmurs, rubs, or gallops  ABDOMEN: Soft, Nontender, Nondistended; Bowel sounds present  EXTREMITIES:   No edema  NEUROLOGY: AAO X 3      LABS:                  CAPILLARY BLOOD GLUCOSE            RADIOLOGY & ADDITIONAL TESTS:    Imaging Personally Reviewed:    Consultant(s) Notes Reviewed:      Care Discussed with Consultants/Other Providers:    Robel Patel MD, CMD, FACP    [address] [id number]  Office Tel: [phone]  Cell: [phone]

## 2021-12-13 NOTE — PROGRESS NOTE ADULT - ASSESSMENT
55 y/o male with pmhx squamous cell carcinoma of tonsills s/p chemo and radiation sent in by pmd  for decreased appetite weight.     FTT  dysphagia  S&S eval noted  pt on easy to chew diet as per SLP  encourage PO intake   PEG in place  cont tube feeds as tolerated with PO intake  nutrition consult  CT neck results noted  will follow  dw pt    Advanced care planning was discussed with patient and family.  Advanced care planning forms were reviewed and discussed.  Risks, benefits and alternatives of gastroenterologic procedures were discussed in detail and all questions were answered.    30 minutes spent.

## 2021-12-13 NOTE — PROGRESS NOTE ADULT - SUBJECTIVE AND OBJECTIVE BOX
[facility]  Willie Moody PA-C  [address] [place] [address]  [phone]      INTERVAL HPI/OVERNIGHT EVENTS:  pt seen and examined, lying in bed, no new events   on easy to chew diet but still with poor PO intake, encouraged po intake  tolerating supplemental tube feedings     MEDICATIONS  (STANDING):  aspirin enteric coated 81 milliGRAM(s) Oral daily  Biotene Dry Mouth Oral Rinse 5 milliLiter(s) Swish and Spit two times a day  dextrose 5% + sodium chloride 0.45%. 1000 milliLiter(s) (50 mL/Hr) IV Continuous <Continuous>  heparin   Injectable 5000 Unit(s) SubCutaneous every 8 hours  influenza   Vaccine 0.5 milliLiter(s) IntraMuscular once  metoprolol succinate ER 25 milliGRAM(s) Oral daily  mirtazapine 15 milliGRAM(s) Oral at bedtime  QUEtiapine 25 milliGRAM(s) Oral daily  thiamine 100 milliGRAM(s) Oral daily    MEDICATIONS  (PRN):  ondansetron Injectable 4 milliGRAM(s) IV Push every 8 hours PRN Nausea and/or Vomiting      Allergies    No Known Allergies    Intolerances        ROS:   General:  No wt loss, fevers, chills, night sweats, fatigue,   Eyes:  Good vision, no reported pain  ENT:  No sore throat, pain, runny nose, dysphagia  CV:  No pain, palpitations, hypo/hypertension  Resp:  No dyspnea, cough, tachypnea, wheezing  GI:  No pain, No nausea, No vomiting, No diarrhea, No constipation, No weight loss, No fever, No pruritis, No rectal bleeding, No tarry stools, + dysphagia,  :  No pain, bleeding, incontinence, nocturia  Muscle:  No pain, weakness  Neuro:  No weakness, tingling, memory problems  Psych:  No fatigue, insomnia, mood problems, depression  Endocrine:  No polyuria, polydipsia, cold/heat intolerance  Heme:  No petechiae, ecchymosis, easy bruisability  Skin:  No rash, tattoos, scars, edema      PHYSICAL EXAM:   Vital Signs:  Vital Signs Last 24 Hrs  T(C): 36.6 (11 Dec 2021 05:32), Max: 36.8 (10 Dec 2021 19:44)  T(F): 97.9 (11 Dec 2021 05:32), Max: 98.3 (10 Dec 2021 19:44)  HR: 71 (11 Dec 2021 05:32) (65 - 83)  BP: 103/72 (11 Dec 2021 05:32) (103/72 - 120/75)  BP(mean): --  RR: 17 (11 Dec 2021 05:32) (16 - 18)  SpO2: 96% (11 Dec 2021 05:32) (96% - 98%)  Daily     Daily     GENERAL:  Appears stated age,   HEENT:  NC/AT,    CHEST:  Full & symmetric excursion,   HEART:  Regular rhythm,  ABDOMEN:  Soft, non-tender, non-distended,  EXTEREMITIES:  no cyanosis  SKIN:  No rash  NEURO:  Alert,       LABS:                        10.4   2.71  )-----------( 266      ( 10 Dec 2021 07:16 )             31.6     12-10    142  |  108  |  23  ----------------------------<  78  4.4   |  24  |  1.24    Ca    9.3      10 Dec 2021 07:15  Phos  3.2     12-  Mg     2.2     12-    TPro  6.0  /  Alb  3.9  /  TBili  0.3  /  DBili  x   /  AST  17  /  ALT  23  /  AlkPhos  51  12-09      Urinalysis Basic - ( 09 Dec 2021 13:54 )    Color: Colorless / Appearance: Clear / S.009 / pH: x  Gluc: x / Ketone: Negative  / Bili: Negative / Urobili: Negative   Blood: x / Protein: Negative / Nitrite: Negative   Leuk Esterase: Negative / RBC: 0 /hpf / WBC 0 /HPF   Sq Epi: x / Non Sq Epi: 0 /hpf / Bacteria: Negative        RADIOLOGY & ADDITIONAL TESTS:   No
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
## 2021-12-13 NOTE — PROGRESS NOTE ADULT - ASSESSMENT
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 Moser for failure to thrive.       # FTT Encourage po diet.   Speech swallow eval noted   Thick liquids as suggested by   Nutrition consult.   PEG feeds   CT neck reviwed  GI f/up appreciated     # Squamous Cell Ca Tonsils s/p chemo Radiation- Hem Onc f/up  CT neck reviewed    # Chronic Systolic dysfunction last admission diagnosed- Patient euvolemic. Hold Lasix .   Cards follow up.       # Depression Home meds.    Psych f/up

## 2021-12-14 PROCEDURE — 69210 REMOVE IMPACTED EAR WAX UNI: CPT

## 2021-12-14 PROCEDURE — 99222 1ST HOSP IP/OBS MODERATE 55: CPT | Mod: 25

## 2021-12-14 RX ORDER — LANOLIN ALCOHOL/MO/W.PET/CERES
5 CREAM (GRAM) TOPICAL ONCE
Refills: 0 | Status: COMPLETED | OUTPATIENT
Start: 2021-12-14 | End: 2021-12-14

## 2021-12-14 RX ORDER — METOCLOPRAMIDE HCL 10 MG
10 TABLET ORAL
Refills: 0 | Status: DISCONTINUED | OUTPATIENT
Start: 2021-12-14 | End: 2021-12-16

## 2021-12-14 RX ORDER — SENNA PLUS 8.6 MG/1
2 TABLET ORAL ONCE
Refills: 0 | Status: COMPLETED | OUTPATIENT
Start: 2021-12-14 | End: 2021-12-14

## 2021-12-14 RX ADMIN — Medication 81 MILLIGRAM(S): at 12:22

## 2021-12-14 RX ADMIN — Medication 5 MILLILITER(S): at 05:54

## 2021-12-14 RX ADMIN — QUETIAPINE FUMARATE 25 MILLIGRAM(S): 200 TABLET, FILM COATED ORAL at 12:22

## 2021-12-14 RX ADMIN — Medication 5 MILLILITER(S): at 17:15

## 2021-12-14 RX ADMIN — HEPARIN SODIUM 5000 UNIT(S): 5000 INJECTION INTRAVENOUS; SUBCUTANEOUS at 13:53

## 2021-12-14 RX ADMIN — Medication 10 MILLIGRAM(S): at 17:17

## 2021-12-14 RX ADMIN — HEPARIN SODIUM 5000 UNIT(S): 5000 INJECTION INTRAVENOUS; SUBCUTANEOUS at 05:54

## 2021-12-14 RX ADMIN — SENNA PLUS 2 TABLET(S): 8.6 TABLET ORAL at 22:03

## 2021-12-14 RX ADMIN — MIRTAZAPINE 15 MILLIGRAM(S): 45 TABLET, ORALLY DISINTEGRATING ORAL at 22:03

## 2021-12-14 RX ADMIN — Medication 100 MILLIGRAM(S): at 12:21

## 2021-12-14 RX ADMIN — Medication 5 MILLIGRAM(S): at 22:03

## 2021-12-14 NOTE — CONSULT NOTE ADULT - ATTENDING COMMENTS
pt with right ear pulsatile tinnitus. cerumen removed. if persists recommend f/u with vascular neuro or Dr. Alejo as outpatient. no evidence of infection. with regards to dysphagia, likely related to hx radiation. pt refused laryngoscopy. recommend GI eval/barium esophagram and nutrition consult. f/u with head and neck as outpatient. [phone]
56 M w/ R tonsillar SCC s/p chemo/RT with complete response p/w FTT. Recommend CT neck w/co to eval for residual disease. Nutrition consult. Consider psych consult given previous suicidal ideations and if that can be contributing to FTT in anyway.

## 2021-12-14 NOTE — CONSULT NOTE ADULT - ASSESSMENT
55 y/o male with pmhx squamous cell carcinoma of tonsills, s/p b/l tonsillectomy 3/2021, s/p chemo and radiation in April 2021 and finished his last dose in June 2021. He was sent in to the ED by his pmd for decreased appetite and weight loss. ENT was consulted for R ear tinnitus and dysphagia. Pt refused laryngoscopy.       Plan:  -Recommend barium esophagram to r/o stricture  -Recommend Nutrition consult-reports using peg but not gaining any weight  -Recommend following up with vascular neurology outpatient or Dr. Campbell for pulsatile tinnitus  -Care as per primary team  -Will continue to follow    Laura Elias PA-C  [facility] [id number] 55 y/o male with pmhx squamous cell carcinoma of tonsills, s/p b/l tonsillectomy 3/2021, s/p chemo and radiation in April 2021 and finished his last dose in June 2021. He was sent in to the ED by his pmd for decreased appetite and weight loss. ENT was consulted for R ear tinnitus and dysphagia. Pt refused laryngoscopy.       Plan:  - right ear with cerumen which was cleared and benign exam  -Recommend following up with vascular neurology outpatient or Dr. Campbell for pulsatile tinnitus  -Recommend Nutrition consult-reports using peg but not gaining any weight  -Recommend barium esophagram to r/o stricture  -Care as per primary team      Laura Elias PA-C  ENT [id number]

## 2021-12-14 NOTE — PROGRESS NOTE ADULT - ASSESSMENT
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     FTT  dysphagia  S&S eval noted  pt on easy to chew diet as per SLP  encourage PO intake   PEG in place  cont tube feeds as tolerated with PO intake  nutrition consult  CT neck results noted  will follow  dw pt    Advanced care planning was discussed with patient and family.  Advanced care planning forms were reviewed and discussed.  Risks, benefits and alternatives of gastroenterologic procedures were discussed in detail and all questions were answered.    30 minutes spent.  55 y/o male with pmhx squamous cell carcinoma of tonsills s/p chemo and radiation sent in by pmd  for decreased appetite weight.     FTT  dysphagia  S&S eval noted  pt on easy to chew diet as per SLP  encourage PO intake   PEG in place  cont tube feeds as tolerated with PO intake  nutrition consult  CT neck results noted, Posttreatment effects throughout the aerodigestive tract  will follow  dw pt    Advanced care planning was discussed with patient and family.  Advanced care planning forms were reviewed and discussed.  Risks, benefits and alternatives of gastroenterologic procedures were discussed in detail and all questions were answered.    30 minutes spent.
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
## 2021-12-14 NOTE — PROGRESS NOTE ADULT - ASSESSMENT
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 Moser for failure to thrive.       # FTT/ Squamous Cell Ca Tonsils s/p chemo Radiation-     ENT f/up noted  PEG feeds   CT neck reviewed  GI f/up appreciated   Barium swallow      # Chronic Systolic dysfunction last admission diagnosed- Patient euvolemic. Hold Lasix .   Cards follow up.

## 2021-12-14 NOTE — CONSULT NOTE ADULT - CONSULT REASON
R ear ringing and Dysphagia
FTT, weight loss
moderate LV dysfunction
SCC head and neck cancer management

## 2021-12-14 NOTE — PROGRESS NOTE ADULT - SUBJECTIVE AND OBJECTIVE BOX
Patient is a 56y old  Male who presents with a chief complaint of Inability to eat x few weeks (14 Dec 2021 12:44)      SUBJECTIVE / OVERNIGHT EVENTS:    Events noted.  CONSTITUTIONAL: No fever,  or fatigue  RESPIRATORY: No cough, wheezing,  No shortness of breath  CARDIOVASCULAR: No chest pain, palpitations, dizziness, or leg swelling  GASTROINTESTINAL: No abdominal or epigastric pain. No nausea, vomiting.  NEUROLOGICAL: No headache    MEDICATIONS  (STANDING):  aspirin enteric coated 81 milliGRAM(s) Oral daily  Biotene Dry Mouth Oral Rinse 5 milliLiter(s) Swish and Spit two times a day  dextrose 5% + sodium chloride 0.45%. 1000 milliLiter(s) (50 mL/Hr) IV Continuous <Continuous>  heparin   Injectable 5000 Unit(s) SubCutaneous every 8 hours  influenza   Vaccine 0.5 milliLiter(s) IntraMuscular once  metoclopramide 10 milliGRAM(s) Oral three times a day before meals  metoprolol succinate ER 25 milliGRAM(s) Oral daily  mirtazapine 15 milliGRAM(s) Oral at bedtime  QUEtiapine 25 milliGRAM(s) Oral daily  thiamine 100 milliGRAM(s) Oral daily    MEDICATIONS  (PRN):        CAPILLARY BLOOD GLUCOSE        I&O's Summary    13 Dec 2021 07:01  -  14 Dec 2021 07:00  --------------------------------------------------------  IN: 1080 mL / OUT: 0 mL / NET: 1080 mL    14 Dec 2021 07:01  -  15 Dec 2021 00:53  --------------------------------------------------------  IN: 1204 mL / OUT: 0 mL / NET: 1204 mL        T(C): 36.7 (12-14-21 @ 20:21), Max: 37.2 (12-14-21 @ 05:56)  HR: 99 (12-14-21 @ 20:21) (80 - 99)  BP: 102/67 (12-14-21 @ 20:21) (100/63 - 111/74)  RR: 18 (12-14-21 @ 20:21) (18 - 18)  SpO2: 99% (12-14-21 @ 20:21) (96% - 99%)    PHYSICAL EXAM:  GENERAL: NAD  NECK: Supple, No JVD  CHEST/LUNG: Clear to auscultation bilaterally; No wheezing.  HEART: Regular rate and rhythm; No murmurs, rubs, or gallops  ABDOMEN: Soft, Nontender, Nondistended; Bowel sounds present  EXTREMITIES:   No edema  NEUROLOGY: AAO       LABS:                  CAPILLARY BLOOD GLUCOSE            RADIOLOGY & ADDITIONAL TESTS:    Imaging Personally Reviewed:    Consultant(s) Notes Reviewed:      Care Discussed with Consultants/Other Providers:    Robel Patel MD, CMD, FACP    [address] [place] [address]  Office Tel: [phone]  Cell: [phone]

## 2021-12-14 NOTE — PROGRESS NOTE ADULT - SUBJECTIVE AND OBJECTIVE BOX
[facility]  Willie Moody PA-C  [address] [place] [address]  [phone]      INTERVAL HPI/OVERNIGHT EVENTS:  pt seen and examined, lying in bed, no new events   still with poor PO intake, states he is eating less than 25% of meals  tolerating supplemental tube feedings     MEDICATIONS  (STANDING):  aspirin enteric coated 81 milliGRAM(s) Oral daily  Biotene Dry Mouth Oral Rinse 5 milliLiter(s) Swish and Spit two times a day  dextrose 5% + sodium chloride 0.45%. 1000 milliLiter(s) (50 mL/Hr) IV Continuous <Continuous>  heparin   Injectable 5000 Unit(s) SubCutaneous every 8 hours  influenza   Vaccine 0.5 milliLiter(s) IntraMuscular once  metoprolol succinate ER 25 milliGRAM(s) Oral daily  mirtazapine 15 milliGRAM(s) Oral at bedtime  QUEtiapine 25 milliGRAM(s) Oral daily  thiamine 100 milliGRAM(s) Oral daily    MEDICATIONS  (PRN):  ondansetron Injectable 4 milliGRAM(s) IV Push every 8 hours PRN Nausea and/or Vomiting      Allergies    No Known Allergies    Intolerances        ROS:   General:  No wt loss, fevers, chills, night sweats, fatigue,   Eyes:  Good vision, no reported pain  ENT:  No sore throat, pain, runny nose, dysphagia  CV:  No pain, palpitations, hypo/hypertension  Resp:  No dyspnea, cough, tachypnea, wheezing  GI:  No pain, No nausea, No vomiting, No diarrhea, No constipation, No weight loss, No fever, No pruritis, No rectal bleeding, No tarry stools, + dysphagia,  :  No pain, bleeding, incontinence, nocturia  Muscle:  No pain, weakness  Neuro:  No weakness, tingling, memory problems  Psych:  No fatigue, insomnia, mood problems, depression  Endocrine:  No polyuria, polydipsia, cold/heat intolerance  Heme:  No petechiae, ecchymosis, easy bruisability  Skin:  No rash, tattoos, scars, edema      PHYSICAL EXAM:   Vital Signs:  Vital Signs Last 24 Hrs  T(C): 36.6 (11 Dec 2021 05:32), Max: 36.8 (10 Dec 2021 19:44)  T(F): 97.9 (11 Dec 2021 05:32), Max: 98.3 (10 Dec 2021 19:44)  HR: 71 (11 Dec 2021 05:32) (65 - 83)  BP: 103/72 (11 Dec 2021 05:32) (103/72 - 120/75)  BP(mean): --  RR: 17 (11 Dec 2021 05:32) (16 - 18)  SpO2: 96% (11 Dec 2021 05:32) (96% - 98%)  Daily     Daily     GENERAL:  Appears stated age,   HEENT:  NC/AT,    CHEST:  Full & symmetric excursion,   HEART:  Regular rhythm,  ABDOMEN:  Soft, non-tender, non-distended,  EXTEREMITIES:  no cyanosis  SKIN:  No rash  NEURO:  Alert,       LABS:                        10.4   2.71  )-----------( 266      ( 10 Dec 2021 07:16 )             31.6     12-10    142  |  108  |  23  ----------------------------<  78  4.4   |  24  |  1.24    Ca    9.3      10 Dec 2021 07:15  Phos  3.2     12-  Mg     2.2     12-    TPro  6.0  /  Alb  3.9  /  TBili  0.3  /  DBili  x   /  AST  17  /  ALT  23  /  AlkPhos  51  12-09      Urinalysis Basic - ( 09 Dec 2021 13:54 )    Color: Colorless / Appearance: Clear / S.009 / pH: x  Gluc: x / Ketone: Negative  / Bili: Negative / Urobili: Negative   Blood: x / Protein: Negative / Nitrite: Negative   Leuk Esterase: Negative / RBC: 0 /hpf / WBC 0 /HPF   Sq Epi: x / Non Sq Epi: 0 /hpf / Bacteria: Negative        RADIOLOGY & ADDITIONAL TESTS:

## 2021-12-14 NOTE — CONSULT NOTE ADULT - REASON FOR ADMISSION
Inability to eat x few weeks
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
## 2021-12-15 PROCEDURE — 74220 X-RAY XM ESOPHAGUS 1CNTRST: CPT | Mod: 26

## 2021-12-15 RX ORDER — SENNA PLUS 8.6 MG/1
2 TABLET ORAL AT BEDTIME
Refills: 0 | Status: DISCONTINUED | OUTPATIENT
Start: 2021-12-15 | End: 2021-12-16

## 2021-12-15 RX ORDER — LANOLIN ALCOHOL/MO/W.PET/CERES
5 CREAM (GRAM) TOPICAL ONCE
Refills: 0 | Status: COMPLETED | OUTPATIENT
Start: 2021-12-15 | End: 2021-12-15

## 2021-12-15 RX ORDER — POLYETHYLENE GLYCOL 3350 17 G/17G
17 POWDER, FOR SOLUTION ORAL ONCE
Refills: 0 | Status: DISCONTINUED | OUTPATIENT
Start: 2021-12-15 | End: 2021-12-16

## 2021-12-15 RX ADMIN — Medication 5 MILLIGRAM(S): at 21:49

## 2021-12-15 RX ADMIN — Medication 25 MILLIGRAM(S): at 04:44

## 2021-12-15 RX ADMIN — Medication 5 MILLILITER(S): at 04:45

## 2021-12-15 RX ADMIN — Medication 10 MILLIGRAM(S): at 12:05

## 2021-12-15 RX ADMIN — SENNA PLUS 2 TABLET(S): 8.6 TABLET ORAL at 21:49

## 2021-12-15 RX ADMIN — Medication 5 MILLILITER(S): at 17:38

## 2021-12-15 RX ADMIN — HEPARIN SODIUM 5000 UNIT(S): 5000 INJECTION INTRAVENOUS; SUBCUTANEOUS at 21:49

## 2021-12-15 RX ADMIN — MIRTAZAPINE 15 MILLIGRAM(S): 45 TABLET, ORALLY DISINTEGRATING ORAL at 21:49

## 2021-12-15 RX ADMIN — Medication 81 MILLIGRAM(S): at 12:05

## 2021-12-15 RX ADMIN — Medication 10 MILLIGRAM(S): at 17:37

## 2021-12-15 RX ADMIN — Medication 100 MILLIGRAM(S): at 12:04

## 2021-12-15 RX ADMIN — QUETIAPINE FUMARATE 25 MILLIGRAM(S): 200 TABLET, FILM COATED ORAL at 12:05

## 2021-12-15 RX ADMIN — HEPARIN SODIUM 5000 UNIT(S): 5000 INJECTION INTRAVENOUS; SUBCUTANEOUS at 04:44

## 2021-12-15 NOTE — PROGRESS NOTE ADULT - SUBJECTIVE AND OBJECTIVE BOX
[facility]  Willie Moody PA-C  [facility] [place] [address]  [phone]      INTERVAL HPI/OVERNIGHT EVENTS:  pt seen and examined, lying in bed, no new events   still with poor PO intake, tolerating supplemental tube feedings   seen by ENT    MEDICATIONS  (STANDING):  aspirin enteric coated 81 milliGRAM(s) Oral daily  Biotene Dry Mouth Oral Rinse 5 milliLiter(s) Swish and Spit two times a day  dextrose 5% + sodium chloride 0.45%. 1000 milliLiter(s) (50 mL/Hr) IV Continuous <Continuous>  heparin   Injectable 5000 Unit(s) SubCutaneous every 8 hours  influenza   Vaccine 0.5 milliLiter(s) IntraMuscular once  metoprolol succinate ER 25 milliGRAM(s) Oral daily  mirtazapine 15 milliGRAM(s) Oral at bedtime  QUEtiapine 25 milliGRAM(s) Oral daily  thiamine 100 milliGRAM(s) Oral daily    MEDICATIONS  (PRN):  ondansetron Injectable 4 milliGRAM(s) IV Push every 8 hours PRN Nausea and/or Vomiting      Allergies    No Known Allergies    Intolerances        ROS:   General:  No wt loss, fevers, chills, night sweats, fatigue,   Eyes:  Good vision, no reported pain  ENT:  No sore throat, pain, runny nose, dysphagia  CV:  No pain, palpitations, hypo/hypertension  Resp:  No dyspnea, cough, tachypnea, wheezing  GI:  No pain, No nausea, No vomiting, No diarrhea, No constipation, No weight loss, No fever, No pruritis, No rectal bleeding, No tarry stools, + dysphagia,  :  No pain, bleeding, incontinence, nocturia  Muscle:  No pain, weakness  Neuro:  No weakness, tingling, memory problems  Psych:  No fatigue, insomnia, mood problems, depression  Endocrine:  No polyuria, polydipsia, cold/heat intolerance  Heme:  No petechiae, ecchymosis, easy bruisability  Skin:  No rash, tattoos, scars, edema      PHYSICAL EXAM:   Vital Signs:  Vital Signs Last 24 Hrs  T(C): 36.6 (11 Dec 2021 05:32), Max: 36.8 (10 Dec 2021 19:44)  T(F): 97.9 (11 Dec 2021 05:32), Max: 98.3 (10 Dec 2021 19:44)  HR: 71 (11 Dec 2021 05:32) (65 - 83)  BP: 103/72 (11 Dec 2021 05:32) (103/72 - 120/75)  BP(mean): --  RR: 17 (11 Dec 2021 05:32) (16 - 18)  SpO2: 96% (11 Dec 2021 05:32) (96% - 98%)  Daily     Daily     GENERAL:  Appears stated age,   HEENT:  NC/AT,    CHEST:  Full & symmetric excursion,   HEART:  Regular rhythm,  ABDOMEN:  Soft, non-tender, non-distended,  EXTEREMITIES:  no cyanosis  SKIN:  No rash  NEURO:  Alert,       LABS:                        10.4   2.71  )-----------( 266      ( 10 Dec 2021 07:16 )             31.6     12-10    142  |  108  |  23  ----------------------------<  78  4.4   |  24  |  1.24    Ca    9.3      10 Dec 2021 07:15  Phos  3.2     12-  Mg     2.2     12-    TPro  6.0  /  Alb  3.9  /  TBili  0.3  /  DBili  x   /  AST  17  /  ALT  23  /  AlkPhos  51  12-09      Urinalysis Basic - ( 09 Dec 2021 13:54 )    Color: Colorless / Appearance: Clear / S.009 / pH: x  Gluc: x / Ketone: Negative  / Bili: Negative / Urobili: Negative   Blood: x / Protein: Negative / Nitrite: Negative   Leuk Esterase: Negative / RBC: 0 /hpf / WBC 0 /HPF   Sq Epi: x / Non Sq Epi: 0 /hpf / Bacteria: Negative        RADIOLOGY & ADDITIONAL TESTS:

## 2021-12-15 NOTE — PROGRESS NOTE ADULT - SUBJECTIVE AND OBJECTIVE BOX
Patient is a 56y old  Male who presents with a chief complaint of Inability to eat x few weeks (14 Dec 2021 12:44)      SUBJECTIVE / OVERNIGHT EVENTS: no events over night     Events noted.  CONSTITUTIONAL: No fever,  or fatigue  RESPIRATORY: No cough, wheezing,  No shortness of breath  CARDIOVASCULAR: No chest pain, palpitations, dizziness, or leg swelling  GASTROINTESTINAL: No abdominal or epigastric pain. No nausea, vomiting.  NEUROLOGICAL: No headache    MEDICATIONS  (STANDING):  aspirin enteric coated 81 milliGRAM(s) Oral daily  Biotene Dry Mouth Oral Rinse 5 milliLiter(s) Swish and Spit two times a day  dextrose 5% + sodium chloride 0.45%. 1000 milliLiter(s) (50 mL/Hr) IV Continuous <Continuous>  heparin   Injectable 5000 Unit(s) SubCutaneous every 8 hours  influenza   Vaccine 0.5 milliLiter(s) IntraMuscular once  metoclopramide 10 milliGRAM(s) Oral three times a day before meals  metoprolol succinate ER 25 milliGRAM(s) Oral daily  mirtazapine 15 milliGRAM(s) Oral at bedtime  QUEtiapine 25 milliGRAM(s) Oral daily  thiamine 100 milliGRAM(s) Oral daily    MEDICATIONS  (PRN):        CAPILLARY BLOOD GLUCOSE        I&O's Summary    13 Dec 2021 07:01  -  14 Dec 2021 07:00  --------------------------------------------------------  IN: 1080 mL / OUT: 0 mL / NET: 1080 mL    14 Dec 2021 07:01  -  15 Dec 2021 00:53  --------------------------------------------------------  IN: 1204 mL / OUT: 0 mL / NET: 1204 mL    T(C): 37.2 (12-15-21 @ 20:22), Max: 37.2 (12-15-21 @ 20:22)  HR: 82 (12-15-21 @ 20:22) (80 - 82)  BP: 100/60 (12-15-21 @ 20:22) (100/60 - 103/72)  RR: 18 (12-15-21 @ 20:22) (18 - 18)  SpO2: 99% (12-15-21 @ 20:22) (99% - 99%)    PHYSICAL EXAM:  GENERAL: NAD  NECK: Supple, No JVD  CHEST/LUNG: Clear to auscultation bilaterally; No wheezing.  HEART: Regular rate and rhythm; No murmurs, rubs, or gallops  ABDOMEN: Soft, Nontender, Nondistended; Bowel sounds present  EXTREMITIES:   No edema  NEUROLOGY: AAO       LABS:                  CAPILLARY BLOOD GLUCOSE            RADIOLOGY & ADDITIONAL TESTS:    Imaging Personally Reviewed:    Consultant(s) Notes Reviewed:      Care Discussed with Consultants/Other Providers:    Robel Patel MD, CMD, FACP    [address] [address]  Office Tel: [phone]  Cell: [phone]

## 2021-12-15 NOTE — PROGRESS NOTE ADULT - ASSESSMENT
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 Moser for failure to thrive.       # FTT/ Squamous Cell Ca Tonsils s/p chemo Radiation-     ENT f/up noted  PEG feeds   CT neck reviewed  GI f/up appreciated   Barium swallow pending       # Chronic Systolic dysfunction last admission diagnosed- Patient euvolemic. Hold Lasix .   Cards follow up.

## 2021-12-15 NOTE — PROGRESS NOTE ADULT - ASSESSMENT
55 y/o male with pmhx squamous cell carcinoma of tonsills s/p chemo and radiation sent in by pmd  for decreased appetite weight.     FTT  dysphagia  S&S eval noted  pt on easy to chew diet as per SLP  encourage PO intake   PEG in place  cont tube feeds as tolerated with PO intake  nutrition consult  CT neck results noted, Posttreatment effects throughout the aerodigestive tract  barium swallow pending   will follow  dw pt    Advanced care planning was discussed with patient and family.  Advanced care planning forms were reviewed and discussed.  Risks, benefits and alternatives of gastroenterologic procedures were discussed in detail and all questions were answered.    30 minutes spent.
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
## 2021-12-16 ENCOUNTER — TRANSCRIPTION ENCOUNTER (OUTPATIENT)
Age: 56
End: 2021-12-16

## 2021-12-16 VITALS
HEART RATE: 83 BPM | SYSTOLIC BLOOD PRESSURE: 102 MMHG | RESPIRATION RATE: 18 BRPM | TEMPERATURE: 98 F | OXYGEN SATURATION: 98 % | DIASTOLIC BLOOD PRESSURE: 69 MMHG

## 2021-12-16 LAB
HCT VFR BLD CALC: 29.5 % — LOW (ref 39–50)
HGB BLD-MCNC: 9.8 G/DL — LOW (ref 13–17)
MCHC RBC-ENTMCNC: 30.3 PG — SIGNIFICANT CHANGE UP (ref 27–34)
MCHC RBC-ENTMCNC: 33.2 GM/DL — SIGNIFICANT CHANGE UP (ref 32–36)
MCV RBC AUTO: 91.3 FL — SIGNIFICANT CHANGE UP (ref 80–100)
NRBC # BLD: 0 /100 WBCS — SIGNIFICANT CHANGE UP (ref 0–0)
PLATELET # BLD AUTO: 263 K/UL — SIGNIFICANT CHANGE UP (ref 150–400)
RBC # BLD: 3.23 M/UL — LOW (ref 4.2–5.8)
RBC # FLD: 16.4 % — HIGH (ref 10.3–14.5)
SARS-COV-2 RNA SPEC QL NAA+PROBE: SIGNIFICANT CHANGE UP
WBC # BLD: 3.56 K/UL — LOW (ref 3.8–10.5)
WBC # FLD AUTO: 3.56 K/UL — LOW (ref 3.8–10.5)

## 2021-12-16 PROCEDURE — 80053 COMPREHEN METABOLIC PANEL: CPT

## 2021-12-16 PROCEDURE — 70491 CT SOFT TISSUE NECK W/DYE: CPT

## 2021-12-16 PROCEDURE — 84100 ASSAY OF PHOSPHORUS: CPT

## 2021-12-16 PROCEDURE — 83690 ASSAY OF LIPASE: CPT

## 2021-12-16 PROCEDURE — 84295 ASSAY OF SERUM SODIUM: CPT

## 2021-12-16 PROCEDURE — 85018 HEMOGLOBIN: CPT

## 2021-12-16 PROCEDURE — 82947 ASSAY GLUCOSE BLOOD QUANT: CPT

## 2021-12-16 PROCEDURE — 81001 URINALYSIS AUTO W/SCOPE: CPT

## 2021-12-16 PROCEDURE — 82803 BLOOD GASES ANY COMBINATION: CPT

## 2021-12-16 PROCEDURE — 92610 EVALUATE SWALLOWING FUNCTION: CPT

## 2021-12-16 PROCEDURE — 71260 CT THORAX DX C+: CPT

## 2021-12-16 PROCEDURE — 96372 THER/PROPH/DIAG INJ SC/IM: CPT

## 2021-12-16 PROCEDURE — 85025 COMPLETE CBC W/AUTO DIFF WBC: CPT

## 2021-12-16 PROCEDURE — 83735 ASSAY OF MAGNESIUM: CPT

## 2021-12-16 PROCEDURE — 86803 HEPATITIS C AB TEST: CPT

## 2021-12-16 PROCEDURE — 85014 HEMATOCRIT: CPT

## 2021-12-16 PROCEDURE — U0005: CPT

## 2021-12-16 PROCEDURE — 74220 X-RAY XM ESOPHAGUS 1CNTRST: CPT

## 2021-12-16 PROCEDURE — 36415 COLL VENOUS BLD VENIPUNCTURE: CPT

## 2021-12-16 PROCEDURE — 87086 URINE CULTURE/COLONY COUNT: CPT

## 2021-12-16 PROCEDURE — 71045 X-RAY EXAM CHEST 1 VIEW: CPT

## 2021-12-16 PROCEDURE — 84132 ASSAY OF SERUM POTASSIUM: CPT

## 2021-12-16 PROCEDURE — 99285 EMERGENCY DEPT VISIT HI MDM: CPT | Mod: 25

## 2021-12-16 PROCEDURE — U0003: CPT

## 2021-12-16 PROCEDURE — 85027 COMPLETE CBC AUTOMATED: CPT

## 2021-12-16 PROCEDURE — 93005 ELECTROCARDIOGRAM TRACING: CPT

## 2021-12-16 PROCEDURE — 82435 ASSAY OF BLOOD CHLORIDE: CPT

## 2021-12-16 PROCEDURE — 82330 ASSAY OF CALCIUM: CPT

## 2021-12-16 PROCEDURE — 80048 BASIC METABOLIC PNL TOTAL CA: CPT

## 2021-12-16 PROCEDURE — 83605 ASSAY OF LACTIC ACID: CPT

## 2021-12-16 RX ORDER — SENNA PLUS 8.6 MG/1
2 TABLET ORAL
Qty: 60 | Refills: 0
Start: 2021-12-16 | End: 2022-01-14

## 2021-12-16 RX ORDER — PANTOPRAZOLE SODIUM 20 MG/1
40 TABLET, DELAYED RELEASE ORAL
Refills: 0 | Status: DISCONTINUED | OUTPATIENT
Start: 2021-12-16 | End: 2021-12-16

## 2021-12-16 RX ORDER — PANTOPRAZOLE SODIUM 20 MG/1
1 TABLET, DELAYED RELEASE ORAL
Qty: 30 | Refills: 0
Start: 2021-12-16 | End: 2022-01-14

## 2021-12-16 RX ADMIN — Medication 25 MILLIGRAM(S): at 05:12

## 2021-12-16 RX ADMIN — Medication 5 MILLILITER(S): at 08:28

## 2021-12-16 RX ADMIN — Medication 100 MILLIGRAM(S): at 13:03

## 2021-12-16 RX ADMIN — QUETIAPINE FUMARATE 25 MILLIGRAM(S): 200 TABLET, FILM COATED ORAL at 13:03

## 2021-12-16 RX ADMIN — Medication 81 MILLIGRAM(S): at 13:03

## 2021-12-16 NOTE — PROGRESS NOTE ADULT - ASSESSMENT
55 y/o male with pmhx squamous cell carcinoma of tonsills s/p chemo and radiation sent in by pmd  for decreased appetite weight.     FTT  dysphagia  S&S eval noted  pt on easy to chew diet as per SLP  encourage PO intake   PEG in place  cont tube feeds as tolerated with PO intake  nutrition consult  CT neck results noted, Posttreatment effects throughout the aerodigestive tract  barium swallow wnl  ENT following   will follow  dw pt    Advanced care planning was discussed with patient and family.  Advanced care planning forms were reviewed and discussed.  Risks, benefits and alternatives of gastroenterologic procedures were discussed in detail and all questions were answered.    30 minutes spent.

## 2021-12-16 NOTE — PROGRESS NOTE ADULT - PROVIDER SPECIALTY LIST ADULT
Gastroenterology
Internal Medicine
Cardiology
Cardiology
Gastroenterology
Hospitalist
Internal Medicine
Internal Medicine
Cardiology
Gastroenterology
Internal Medicine
Cardiology
Cardiology
Gastroenterology
Hospitalist
Internal Medicine

## 2021-12-16 NOTE — DISCHARGE NOTE NURSING/CASE MANAGEMENT/SOCIAL WORK - PATIENT PORTAL LINK FT
You can access the FollowMyHealth Patient Portal offered by [facility] by registering at the following website: [email or web address]. By joining [facility]’s FollowMyHealth portal, you will also be able to view your health information using other applications (apps) compatible with our system.

## 2021-12-16 NOTE — PROGRESS NOTE ADULT - NUTRITIONAL ASSESSMENT
This patient has been assessed with a concern for Malnutrition and has been determined to have a diagnosis/diagnoses of Severe protein-calorie malnutrition and Underweight (BMI < 19).    This patient is being managed with:   Diet Easy to Chew-  Tube Feeding Modality: Gastrostomy  TwoCal HN (TWOCALHNRTH)  Total Volume for 24 Hours (mL): 960  Bolus  Total Volume of Bolus (mL):  240  Total # of Feeds: 4  Tube Feed Frequency: Every 6 hours   Tube Feed Start Time: 11:00  Bolus Feed Rate (mL per Hour): 240   Bolus Feed Duration (in Hours): 1  Supplement Feeding Modality:  Oral  Ensure Enlive Cans or Servings Per Day:  3       Frequency:  Daily  Entered: Dec 10 2021 10:17AM    

## 2021-12-16 NOTE — PROGRESS NOTE ADULT - SUBJECTIVE AND OBJECTIVE BOX
[facility]  Willie Moody PA-C  [address] [place] [address] [phone]      INTERVAL HPI/OVERNIGHT EVENTS:  pt seen and examined, lying in bed comfortably   frustrated, keeps saying he wants to leave  as per wife pt take reglan at home for metallic taste in mouth    MEDICATIONS  (STANDING):  aspirin enteric coated 81 milliGRAM(s) Oral daily  Biotene Dry Mouth Oral Rinse 5 milliLiter(s) Swish and Spit two times a day  dextrose 5% + sodium chloride 0.45%. 1000 milliLiter(s) (50 mL/Hr) IV Continuous <Continuous>  heparin   Injectable 5000 Unit(s) SubCutaneous every 8 hours  influenza   Vaccine 0.5 milliLiter(s) IntraMuscular once  metoprolol succinate ER 25 milliGRAM(s) Oral daily  mirtazapine 15 milliGRAM(s) Oral at bedtime  QUEtiapine 25 milliGRAM(s) Oral daily  thiamine 100 milliGRAM(s) Oral daily    MEDICATIONS  (PRN):  ondansetron Injectable 4 milliGRAM(s) IV Push every 8 hours PRN Nausea and/or Vomiting      Allergies    No Known Allergies    Intolerances        ROS:   General:  No wt loss, fevers, chills, night sweats, fatigue,   Eyes:  Good vision, no reported pain  ENT:  No sore throat, pain, runny nose, dysphagia  CV:  No pain, palpitations, hypo/hypertension  Resp:  No dyspnea, cough, tachypnea, wheezing  GI:  No pain, No nausea, No vomiting, No diarrhea, No constipation, No weight loss, No fever, No pruritis, No rectal bleeding, No tarry stools, + dysphagia,  :  No pain, bleeding, incontinence, nocturia  Muscle:  No pain, weakness  Neuro:  No weakness, tingling, memory problems  Psych:  No fatigue, insomnia, mood problems, depression  Endocrine:  No polyuria, polydipsia, cold/heat intolerance  Heme:  No petechiae, ecchymosis, easy bruisability  Skin:  No rash, tattoos, scars, edema      PHYSICAL EXAM:   Vital Signs:  Vital Signs Last 24 Hrs  T(C): 36.6 (11 Dec 2021 05:32), Max: 36.8 (10 Dec 2021 19:44)  T(F): 97.9 (11 Dec 2021 05:32), Max: 98.3 (10 Dec 2021 19:44)  HR: 71 (11 Dec 2021 05:32) (65 - 83)  BP: 103/72 (11 Dec 2021 05:32) (103/72 - 120/75)  BP(mean): --  RR: 17 (11 Dec 2021 05:32) (16 - 18)  SpO2: 96% (11 Dec 2021 05:32) (96% - 98%)  Daily     Daily     GENERAL:  Appears stated age,   HEENT:  NC/AT,    CHEST:  Full & symmetric excursion,   HEART:  Regular rhythm,  ABDOMEN:  Soft, non-tender, non-distended,  EXTEREMITIES:  no cyanosis  SKIN:  No rash  NEURO:  Alert,       LABS:                        10.4   2.71  )-----------( 266      ( 10 Dec 2021 07:16 )             31.6     12-10    142  |  108  |  23  ----------------------------<  78  4.4   |  24  |  1.24    Ca    9.3      10 Dec 2021 07:15  Phos  3.2     12-  Mg     2.2     12-    TPro  6.0  /  Alb  3.9  /  TBili  0.3  /  DBili  x   /  AST  17  /  ALT  23  /  AlkPhos  51  12-      Urinalysis Basic - ( 09 Dec 2021 13:54 )    Color: Colorless / Appearance: Clear / S.009 / pH: x  Gluc: x / Ketone: Negative  / Bili: Negative / Urobili: Negative   Blood: x / Protein: Negative / Nitrite: Negative   Leuk Esterase: Negative / RBC: 0 /hpf / WBC 0 /HPF   Sq Epi: x / Non Sq Epi: 0 /hpf / Bacteria: Negative        RADIOLOGY & ADDITIONAL TESTS:   [facility]  Willie Moody PA-C  [facility] [place] [address]  [phone]      INTERVAL HPI/OVERNIGHT EVENTS:  pt seen and examined, lying in bed comfortably   frustrated, keeps saying he wants to leave  still with poor PO intake       MEDICATIONS  (STANDING):  aspirin enteric coated 81 milliGRAM(s) Oral daily  Biotene Dry Mouth Oral Rinse 5 milliLiter(s) Swish and Spit two times a day  dextrose 5% + sodium chloride 0.45%. 1000 milliLiter(s) (50 mL/Hr) IV Continuous <Continuous>  heparin   Injectable 5000 Unit(s) SubCutaneous every 8 hours  influenza   Vaccine 0.5 milliLiter(s) IntraMuscular once  metoprolol succinate ER 25 milliGRAM(s) Oral daily  mirtazapine 15 milliGRAM(s) Oral at bedtime  QUEtiapine 25 milliGRAM(s) Oral daily  thiamine 100 milliGRAM(s) Oral daily    MEDICATIONS  (PRN):  ondansetron Injectable 4 milliGRAM(s) IV Push every 8 hours PRN Nausea and/or Vomiting      Allergies    No Known Allergies    Intolerances        ROS:   General:  No wt loss, fevers, chills, night sweats, fatigue,   Eyes:  Good vision, no reported pain  ENT:  No sore throat, pain, runny nose, dysphagia  CV:  No pain, palpitations, hypo/hypertension  Resp:  No dyspnea, cough, tachypnea, wheezing  GI:  No pain, No nausea, No vomiting, No diarrhea, No constipation, No weight loss, No fever, No pruritis, No rectal bleeding, No tarry stools, + dysphagia,  :  No pain, bleeding, incontinence, nocturia  Muscle:  No pain, weakness  Neuro:  No weakness, tingling, memory problems  Psych:  No fatigue, insomnia, mood problems, depression  Endocrine:  No polyuria, polydipsia, cold/heat intolerance  Heme:  No petechiae, ecchymosis, easy bruisability  Skin:  No rash, tattoos, scars, edema      PHYSICAL EXAM:   Vital Signs:  Vital Signs Last 24 Hrs  T(C): 36.6 (11 Dec 2021 05:32), Max: 36.8 (10 Dec 2021 19:44)  T(F): 97.9 (11 Dec 2021 05:32), Max: 98.3 (10 Dec 2021 19:44)  HR: 71 (11 Dec 2021 05:32) (65 - 83)  BP: 103/72 (11 Dec 2021 05:32) (103/72 - 120/75)  BP(mean): --  RR: 17 (11 Dec 2021 05:32) (16 - 18)  SpO2: 96% (11 Dec 2021 05:32) (96% - 98%)  Daily     Daily     GENERAL:  Appears stated age,   HEENT:  NC/AT,    CHEST:  Full & symmetric excursion,   HEART:  Regular rhythm,  ABDOMEN:  Soft, non-tender, non-distended,  EXTEREMITIES:  no cyanosis  SKIN:  No rash  NEURO:  Alert,       LABS:                        10.4   2.71  )-----------( 266      ( 10 Dec 2021 07:16 )             31.6     12-10    142  |  108  |  23  ----------------------------<  78  4.4   |  24  |  1.24    Ca    9.3      10 Dec 2021 07:15  Phos  3.2     12-  Mg     2.2     12-    TPro  6.0  /  Alb  3.9  /  TBili  0.3  /  DBili  x   /  AST  17  /  ALT  23  /  AlkPhos  51  12-09      Urinalysis Basic - ( 09 Dec 2021 13:54 )    Color: Colorless / Appearance: Clear / S.009 / pH: x  Gluc: x / Ketone: Negative  / Bili: Negative / Urobili: Negative   Blood: x / Protein: Negative / Nitrite: Negative   Leuk Esterase: Negative / RBC: 0 /hpf / WBC 0 /HPF   Sq Epi: x / Non Sq Epi: 0 /hpf / Bacteria: Negative        RADIOLOGY & ADDITIONAL TESTS:

## 2021-12-16 NOTE — PROGRESS NOTE ADULT - REASON FOR ADMISSION
Inability to eat x few weeks

## 2021-12-16 NOTE — PROGRESS NOTE ADULT - ASSESSMENT
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 Moser for failure to thrive.       # FTT/ Squamous Cell Ca Tonsils s/p chemo Radiation-     ENT f/up noted  PEG feeds   CT neck reviewed  GI f/up appreciated   Barium swallow ruba cute findings     D/C planning Needs follow up with ENT GI.   Time spent coordinating discharge plan 40 minutes       # Anxiety/ Depression-       Psych consulted.   Meds adjusted     # Chronic Systolic dysfunction last admission diagnosed- Patient euvolemic. Hold Lasix .   Cards follow up.

## 2021-12-16 NOTE — DISCHARGE NOTE NURSING/CASE MANAGEMENT/SOCIAL WORK - NSDCPEFALRISK_GEN_ALL_CORE
For information on Fall & Injury Prevention, visit: [email or web address] OR  [email or web address] OR  https://www.cdc.gov/steadi/patient.html

## 2021-12-16 NOTE — PROVIDER CONTACT NOTE (MEDICATION) - RECOMMENDATIONS
Notify NP, RN explained to patient the importance of keeping track of meds he is taking and how he should not be taking home meds without staff knowing. Pt frustrated, stating he does not care. RN asked patient to voice his concerns when MDs come to speak to him.

## 2021-12-16 NOTE — PROGRESS NOTE ADULT - SUBJECTIVE AND OBJECTIVE BOX
Patient is a 56y old  Male who presents with a chief complaint of Inability to eat x few weeks (14 Dec 2021 12:44)      SUBJECTIVE / OVERNIGHT EVENTS: no events over night     Events noted.  CONSTITUTIONAL: No fever,  or fatigue  RESPIRATORY: No cough, wheezing,  No shortness of breath  CARDIOVASCULAR: No chest pain, palpitations, dizziness, or leg swelling  GASTROINTESTINAL: No abdominal or epigastric pain. No nausea, vomiting.  NEUROLOGICAL: No headache    MEDICATIONS  (STANDING):  aspirin enteric coated 81 milliGRAM(s) Oral daily  Biotene Dry Mouth Oral Rinse 5 milliLiter(s) Swish and Spit two times a day  dextrose 5% + sodium chloride 0.45%. 1000 milliLiter(s) (50 mL/Hr) IV Continuous <Continuous>  heparin   Injectable 5000 Unit(s) SubCutaneous every 8 hours  influenza   Vaccine 0.5 milliLiter(s) IntraMuscular once  metoclopramide 10 milliGRAM(s) Oral three times a day before meals  metoprolol succinate ER 25 milliGRAM(s) Oral daily  mirtazapine 15 milliGRAM(s) Oral at bedtime  QUEtiapine 25 milliGRAM(s) Oral daily  thiamine 100 milliGRAM(s) Oral daily    MEDICATIONS  (PRN):        CAPILLARY BLOOD GLUCOSE        I&O's Summary    13 Dec 2021 07:01  -  14 Dec 2021 07:00  --------------------------------------------------------  IN: 1080 mL / OUT: 0 mL / NET: 1080 mL    14 Dec 2021 07:01  -  15 Dec 2021 00:53  --------------------------------------------------------  IN: 1204 mL / OUT: 0 mL / NET: 1204 mL    T(C): 37.2 (12-15-21 @ 20:22), Max: 37.2 (12-15-21 @ 20:22)  HR: 82 (12-15-21 @ 20:22) (80 - 82)  BP: 100/60 (12-15-21 @ 20:22) (100/60 - 103/72)  RR: 18 (12-15-21 @ 20:22) (18 - 18)  SpO2: 99% (12-15-21 @ 20:22) (99% - 99%)    PHYSICAL EXAM:  GENERAL: NAD  NECK: Supple, No JVD  CHEST/LUNG: Clear to auscultation bilaterally; No wheezing.  HEART: Regular rate and rhythm; No murmurs, rubs, or gallops  ABDOMEN: Soft, Nontender, Nondistended; Bowel sounds present  EXTREMITIES:   No edema  NEUROLOGY: AAO       LABS:                  CAPILLARY BLOOD GLUCOSE            RADIOLOGY & ADDITIONAL TESTS:    Imaging Personally Reviewed:    Consultant(s) Notes Reviewed:      Care Discussed with Consultants/Other Providers:    Robel Patel MD, CMD, FACP    [address] [address]  Office Tel: [phone]  Cell: [phone]
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
## 2021-12-26 NOTE — CHART NOTE - NSCHARTNOTEFT_GEN_A_CORE
From the Oncology perspective pt with no recurrent malignancy that would account for his FTT.     Imaging:  < from: CT Neck Soft Tissue w/ IV Cont (12.10.21 @ 18:46) >  IMPRESSION: No evidence of residual or recurrent oropharyngeal tumor.  Posttreatment effects throughout the aerodigestive tract.  No cervical lymphadenopathy.        < from: CT Chest w/ IV Cont (12.10.21 @ 18:46) >  IMPRESSION:  Clear lungs.      Plan:  -No further recommendations from the Oncology perspective. Given no active malignancy, there is no plan for chemo or RT  -Low suspicion for his FTT to be caused by recurrent malignancy  -c/w Psy eval  -Oncology signing off      Marcus Still M.D.  Hematology/Oncology Fellow PGY4  Pager [phone]  After 5pm, please contact on-call team.
Patient was informed of referrals and timeframe on 12/26/2021. Will outreach again to confirm follow up appt was made.
Consulted by ED for pt with known SCC head and neck cancer followed by Dr. Quiroz.     Recommend:  -CT neck/ chest  -Possible GI eval, to evaluate PEG  -Nutrition consult  -Consider Psy eval given hx of severe depression and suicidal ideation  -Consider palliative care eval given failure to thrive despite PEG feeds  -Full consult to come pending imaging    Please page with questions or concerns. Will Follow with you.      Marcus Still M.D.  Hematology/Oncology Fellow PGY4  Pager [phone]  After 5pm, please contact on-call team.

## 2022-01-14 DIAGNOSIS — Z01.818 ENCOUNTER FOR OTHER PREPROCEDURAL EXAMINATION: ICD-10-CM

## 2022-01-26 LAB — SARS-COV-2 N GENE NPH QL NAA+PROBE: NOT DETECTED

## 2022-01-28 ENCOUNTER — OUTPATIENT (OUTPATIENT)
Dept: OUTPATIENT SERVICES | Facility: HOSPITAL | Age: 57
LOS: 1 days | Discharge: HOME | End: 2022-01-28

## 2022-01-28 ENCOUNTER — APPOINTMENT (OUTPATIENT)
Dept: THORACIC SURGERY | Facility: HOSPITAL | Age: 57
End: 2022-01-28
Payer: MEDICAID

## 2022-01-28 VITALS
TEMPERATURE: 97 F | OXYGEN SATURATION: 98 % | HEART RATE: 68 BPM | WEIGHT: 125 LBS | SYSTOLIC BLOOD PRESSURE: 103 MMHG | HEIGHT: 74 IN | RESPIRATION RATE: 10 BRPM | DIASTOLIC BLOOD PRESSURE: 63 MMHG

## 2022-01-28 VITALS
RESPIRATION RATE: 12 BRPM | SYSTOLIC BLOOD PRESSURE: 124 MMHG | DIASTOLIC BLOOD PRESSURE: 81 MMHG | HEART RATE: 62 BPM | OXYGEN SATURATION: 97 %

## 2022-01-28 DIAGNOSIS — Z98.89 OTHER SPECIFIED POSTPROCEDURAL STATES: Chronic | ICD-10-CM

## 2022-01-28 DIAGNOSIS — Z98.890 OTHER SPECIFIED POSTPROCEDURAL STATES: Chronic | ICD-10-CM

## 2022-01-28 DIAGNOSIS — R13.0 APHAGIA: ICD-10-CM

## 2022-01-28 PROCEDURE — 43247 EGD REMOVE FOREIGN BODY: CPT | Mod: 52

## 2022-01-28 RX ORDER — ACETAMINOPHEN 500 MG
1000 TABLET ORAL ONCE
Refills: 0 | Status: COMPLETED | OUTPATIENT
Start: 2022-01-28 | End: 2022-01-28

## 2022-01-28 RX ADMIN — Medication 400 MILLIGRAM(S): at 09:05

## 2022-01-28 NOTE — SWALLOW BEDSIDE ASSESSMENT ADULT - ASR SWALLOW REFERRAL
Pending Nutrition consult. Recommend Psych consult given expressed suicidal ideation-- NP/RN made aware immediately. 1. As medically feasible, advance diet to [facility]--> Regular Diet (monitor need for ONS) 2. Encourage pt to meet nutritional needs as able 3. RD to obtain subjective information/provide diet ed as able 4. Pain and bowel regimen per team 5. Consider phos binder as needed 6. Monitor risk for malnutrition
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
## 2022-01-28 NOTE — ASU PATIENT PROFILE, ADULT - FALL HARM RISK - UNIVERSAL INTERVENTIONS
Bed in lowest position, wheels locked, appropriate side rails in place/Call bell, personal items and telephone in reach/Instruct patient to call for assistance before getting out of bed or chair/Non-slip footwear when patient is out of bed/[place] to call system/Physically safe environment - no spills, clutter or unnecessary equipment/Purposeful Proactive Rounding/Room/bathroom lighting operational, light cord in reach

## 2023-07-10 ENCOUNTER — OUTPATIENT (OUTPATIENT)
Dept: OUTPATIENT SERVICES | Facility: HOSPITAL | Age: 58
LOS: 1 days | Discharge: HOME | End: 2023-07-10

## 2023-07-10 DIAGNOSIS — Z98.89 OTHER SPECIFIED POSTPROCEDURAL STATES: Chronic | ICD-10-CM

## 2023-07-10 DIAGNOSIS — C09.9 MALIGNANT NEOPLASM OF TONSIL, UNSPECIFIED: ICD-10-CM

## 2023-07-10 DIAGNOSIS — Z98.890 OTHER SPECIFIED POSTPROCEDURAL STATES: Chronic | ICD-10-CM

## 2023-07-12 ENCOUNTER — APPOINTMENT (OUTPATIENT)
Dept: HEMATOLOGY ONCOLOGY | Facility: CLINIC | Age: 58
End: 2023-07-12

## 2024-02-13 NOTE — ASU PATIENT PROFILE, ADULT - AS SC BRADEN SENSORY
[id number]: 4) no impairment

## 2025-04-10 ENCOUNTER — APPOINTMENT (OUTPATIENT)
Dept: PHYSICAL MEDICINE AND REHAB | Facility: CLINIC | Age: 60
End: 2025-04-10

## 2025-04-10 VITALS
SYSTOLIC BLOOD PRESSURE: 120 MMHG | RESPIRATION RATE: 16 BRPM | DIASTOLIC BLOOD PRESSURE: 87 MMHG | OXYGEN SATURATION: 98 % | BODY MASS INDEX: 20.54 KG/M2 | WEIGHT: 155 LBS | TEMPERATURE: 97.7 F | HEIGHT: 73 IN | HEART RATE: 96 BPM

## 2025-04-10 DIAGNOSIS — M17.11 UNILATERAL PRIMARY OSTEOARTHRITIS, RIGHT KNEE: ICD-10-CM

## 2025-04-10 PROCEDURE — 20610 DRAIN/INJ JOINT/BURSA W/O US: CPT | Mod: RT

## 2025-04-10 PROCEDURE — 99203 OFFICE O/P NEW LOW 30 MIN: CPT | Mod: 25

## 2025-04-10 RX ORDER — CELECOXIB 100 MG/1
100 CAPSULE ORAL
Qty: 30 | Refills: 0 | Status: ACTIVE | COMMUNITY
Start: 2025-04-10 | End: 1900-01-01

## 2025-04-11 RX ORDER — HYDROCODONE BITARTRATE AND ACETAMINOPHEN 5; 325 MG/1; MG/1
5-325 TABLET ORAL 4 TIMES DAILY
Qty: 28 | Refills: 0 | Status: ACTIVE | COMMUNITY
Start: 2025-04-11 | End: 1900-01-01

## 2025-04-21 PROBLEM — M77.8 TENDINITIS OF RIGHT SHOULDER: Status: ACTIVE | Noted: 2025-04-21

## 2025-05-22 ENCOUNTER — APPOINTMENT (OUTPATIENT)
Dept: PHYSICAL MEDICINE AND REHAB | Facility: CLINIC | Age: 60
End: 2025-05-22

## (undated) DEVICE — CLAMP BX HOT RAD JAW 3

## (undated) DEVICE — CONTAINER FORMALIN 80ML YELLOW

## (undated) DEVICE — LUBRICATING JELLY HR ONE SHOT 3G

## (undated) DEVICE — FACESHIELD FULL VISOR

## (undated) DEVICE — BIOPSY FORCEP RADIAL JAW 4 STANDARD WITH NEEDLE

## (undated) DEVICE — DRSG BANDAID 0.75X3"

## (undated) DEVICE — BASIN EMESIS 10IN GRADUATED MAUVE

## (undated) DEVICE — TUBING MEDI-VAC W MAXIGRIP CONNECTORS 1/4"X6'

## (undated) DEVICE — BITE BLOCK ADULT 20 X 27MM (GREEN)

## (undated) DEVICE — LINE BREATHE SAMPLNG

## (undated) DEVICE — CATH IV SAFE BC 22G X 1" (BLUE)

## (undated) DEVICE — GOWN LG

## (undated) DEVICE — TUBING IV SET GRAVITY 3Y 100" MACRO

## (undated) DEVICE — DENTURE CUP PINK

## (undated) DEVICE — BIOPSY FORCEP COLD DISP

## (undated) DEVICE — DRSG 2X2

## (undated) DEVICE — UNDERPAD LINEN SAVER 17 X 24"

## (undated) DEVICE — ELCTR ECG CONDUCTIVE ADHESIVE

## (undated) DEVICE — DRSG CURITY GAUZE SPONGE 4 X 4" 12-PLY NON-STERILE

## (undated) DEVICE — SALIVA EJECTOR (BLUE)

## (undated) DEVICE — PACK IV START WITH CHG